# Patient Record
Sex: MALE | Race: WHITE | Employment: OTHER | URBAN - METROPOLITAN AREA
[De-identification: names, ages, dates, MRNs, and addresses within clinical notes are randomized per-mention and may not be internally consistent; named-entity substitution may affect disease eponyms.]

---

## 2017-03-10 ENCOUNTER — LAB ENCOUNTER (OUTPATIENT)
Dept: LAB | Age: 67
End: 2017-03-10
Attending: INTERNAL MEDICINE
Payer: MEDICARE

## 2017-03-10 DIAGNOSIS — I48.20 CHRONIC ATRIAL FIBRILLATION (HCC): Primary | ICD-10-CM

## 2017-03-10 LAB
INR BLD: 1.4 (ref 0.9–1.2)
PROTHROMBIN TIME: 17 SECONDS (ref 11.8–14.5)

## 2017-03-10 PROCEDURE — 36415 COLL VENOUS BLD VENIPUNCTURE: CPT

## 2017-03-10 PROCEDURE — 85610 PROTHROMBIN TIME: CPT

## 2017-03-15 ENCOUNTER — LAB ENCOUNTER (OUTPATIENT)
Dept: LAB | Age: 67
End: 2017-03-15
Attending: INTERNAL MEDICINE
Payer: MEDICARE

## 2017-03-15 DIAGNOSIS — I25.10 CORONARY ATHEROSCLEROSIS OF NATIVE CORONARY ARTERY: Primary | ICD-10-CM

## 2017-03-15 LAB
CHOLEST SERPL-MCNC: 216 MG/DL (ref 110–200)
HDLC SERPL-MCNC: 35 MG/DL
LDLC SERPL CALC-MCNC: 134 MG/DL (ref 0–99)
NONHDLC SERPL-MCNC: 181 MG/DL
TRIGL SERPL-MCNC: 233 MG/DL (ref 1–149)

## 2017-03-15 PROCEDURE — 36415 COLL VENOUS BLD VENIPUNCTURE: CPT

## 2017-03-15 PROCEDURE — 80061 LIPID PANEL: CPT

## 2017-04-04 ENCOUNTER — LAB ENCOUNTER (OUTPATIENT)
Dept: LAB | Age: 67
End: 2017-04-04
Attending: INTERNAL MEDICINE
Payer: MEDICARE

## 2017-04-04 DIAGNOSIS — I25.10 CORONARY ATHEROSCLEROSIS OF NATIVE CORONARY ARTERY: Primary | ICD-10-CM

## 2017-04-04 PROCEDURE — 85610 PROTHROMBIN TIME: CPT

## 2017-04-04 PROCEDURE — 36415 COLL VENOUS BLD VENIPUNCTURE: CPT

## 2017-05-11 ENCOUNTER — LAB ENCOUNTER (OUTPATIENT)
Dept: LAB | Age: 67
End: 2017-05-11
Attending: INTERNAL MEDICINE
Payer: MEDICARE

## 2017-05-11 DIAGNOSIS — I25.10 CORONARY ATHEROSCLEROSIS OF NATIVE CORONARY ARTERY: ICD-10-CM

## 2017-05-11 DIAGNOSIS — I48.20 CHRONIC ATRIAL FIBRILLATION (HCC): Primary | ICD-10-CM

## 2017-05-11 PROCEDURE — 36415 COLL VENOUS BLD VENIPUNCTURE: CPT

## 2017-05-11 PROCEDURE — 85610 PROTHROMBIN TIME: CPT

## 2017-05-11 PROCEDURE — 80061 LIPID PANEL: CPT

## 2017-06-12 ENCOUNTER — LAB ENCOUNTER (OUTPATIENT)
Dept: LAB | Age: 67
End: 2017-06-12
Attending: INTERNAL MEDICINE
Payer: MEDICARE

## 2017-06-12 DIAGNOSIS — I48.20 CHRONIC ATRIAL FIBRILLATION (HCC): ICD-10-CM

## 2017-06-12 DIAGNOSIS — I25.10 CORONARY ATHEROSCLEROSIS OF NATIVE CORONARY ARTERY: Primary | ICD-10-CM

## 2017-06-12 PROCEDURE — 36415 COLL VENOUS BLD VENIPUNCTURE: CPT

## 2017-06-12 PROCEDURE — 85610 PROTHROMBIN TIME: CPT

## 2017-07-26 ENCOUNTER — LAB ENCOUNTER (OUTPATIENT)
Dept: LAB | Age: 67
End: 2017-07-26
Attending: INTERNAL MEDICINE
Payer: MEDICARE

## 2017-07-26 DIAGNOSIS — I25.10 CORONARY ATHEROSCLEROSIS OF NATIVE CORONARY ARTERY: Primary | ICD-10-CM

## 2017-07-26 LAB
ANION GAP SERPL CALC-SCNC: 7 MMOL/L (ref 0–18)
BUN SERPL-MCNC: 45 MG/DL (ref 8–20)
BUN/CREAT SERPL: 16.5 (ref 10–20)
CALCIUM SERPL-MCNC: 9.1 MG/DL (ref 8.5–10.5)
CHLORIDE SERPL-SCNC: 105 MMOL/L (ref 95–110)
CHOLEST SERPL-MCNC: 165 MG/DL (ref 110–200)
CO2 SERPL-SCNC: 26 MMOL/L (ref 22–32)
CREAT SERPL-MCNC: 2.73 MG/DL (ref 0.5–1.5)
GLUCOSE SERPL-MCNC: 120 MG/DL (ref 70–99)
HDLC SERPL-MCNC: 40 MG/DL
LDLC SERPL CALC-MCNC: 97 MG/DL (ref 0–99)
NONHDLC SERPL-MCNC: 125 MG/DL
OSMOLALITY UR CALC.SUM OF ELEC: 299 MOSM/KG (ref 275–295)
POTASSIUM SERPL-SCNC: 5.8 MMOL/L (ref 3.3–5.1)
SODIUM SERPL-SCNC: 138 MMOL/L (ref 136–144)
TRIGL SERPL-MCNC: 138 MG/DL (ref 1–149)

## 2017-07-26 PROCEDURE — 80061 LIPID PANEL: CPT

## 2017-07-26 PROCEDURE — 36415 COLL VENOUS BLD VENIPUNCTURE: CPT

## 2017-07-26 PROCEDURE — 80048 BASIC METABOLIC PNL TOTAL CA: CPT

## 2017-09-13 ENCOUNTER — LAB ENCOUNTER (OUTPATIENT)
Dept: LAB | Age: 67
End: 2017-09-13
Attending: INTERNAL MEDICINE
Payer: MEDICARE

## 2017-09-13 DIAGNOSIS — I48.91 ATRIAL FIBRILLATION (HCC): Primary | ICD-10-CM

## 2017-09-13 LAB
ANION GAP SERPL CALC-SCNC: 8 MMOL/L (ref 0–18)
BUN SERPL-MCNC: 41 MG/DL (ref 8–20)
BUN/CREAT SERPL: 15.6 (ref 10–20)
CALCIUM SERPL-MCNC: 9.4 MG/DL (ref 8.5–10.5)
CHLORIDE SERPL-SCNC: 107 MMOL/L (ref 95–110)
CO2 SERPL-SCNC: 25 MMOL/L (ref 22–32)
CREAT SERPL-MCNC: 2.63 MG/DL (ref 0.5–1.5)
GLUCOSE SERPL-MCNC: 99 MG/DL (ref 70–99)
INR BLD: 1.6 (ref 0.9–1.2)
OSMOLALITY UR CALC.SUM OF ELEC: 300 MOSM/KG (ref 275–295)
POTASSIUM SERPL-SCNC: 5.1 MMOL/L (ref 3.3–5.1)
PROTHROMBIN TIME: 18.5 SECONDS (ref 11.8–14.5)
SODIUM SERPL-SCNC: 140 MMOL/L (ref 136–144)

## 2017-09-13 PROCEDURE — 80048 BASIC METABOLIC PNL TOTAL CA: CPT

## 2017-09-13 PROCEDURE — 36415 COLL VENOUS BLD VENIPUNCTURE: CPT

## 2017-09-13 PROCEDURE — 85610 PROTHROMBIN TIME: CPT

## 2017-10-23 ENCOUNTER — LAB ENCOUNTER (OUTPATIENT)
Dept: LAB | Age: 67
End: 2017-10-23
Attending: INTERNAL MEDICINE
Payer: MEDICARE

## 2017-10-23 DIAGNOSIS — I48.91 A-FIB (HCC): Primary | ICD-10-CM

## 2017-10-23 PROCEDURE — 80048 BASIC METABOLIC PNL TOTAL CA: CPT

## 2017-10-23 PROCEDURE — 85610 PROTHROMBIN TIME: CPT

## 2017-10-23 PROCEDURE — 36415 COLL VENOUS BLD VENIPUNCTURE: CPT

## 2017-12-15 ENCOUNTER — LAB ENCOUNTER (OUTPATIENT)
Dept: LAB | Age: 67
End: 2017-12-15
Attending: INTERNAL MEDICINE
Payer: MEDICARE

## 2017-12-15 DIAGNOSIS — I48.20 CHRONIC ATRIAL FIBRILLATION (HCC): Primary | ICD-10-CM

## 2017-12-15 PROCEDURE — 80048 BASIC METABOLIC PNL TOTAL CA: CPT

## 2017-12-15 PROCEDURE — 85610 PROTHROMBIN TIME: CPT

## 2017-12-15 PROCEDURE — 36415 COLL VENOUS BLD VENIPUNCTURE: CPT

## 2017-12-18 ENCOUNTER — LAB ENCOUNTER (OUTPATIENT)
Dept: LAB | Age: 67
End: 2017-12-18
Attending: INTERNAL MEDICINE
Payer: MEDICARE

## 2017-12-18 DIAGNOSIS — I48.20 CHRONIC ATRIAL FIBRILLATION (HCC): Primary | ICD-10-CM

## 2017-12-18 PROCEDURE — 80048 BASIC METABOLIC PNL TOTAL CA: CPT

## 2017-12-18 PROCEDURE — 36415 COLL VENOUS BLD VENIPUNCTURE: CPT

## 2017-12-18 PROCEDURE — 85610 PROTHROMBIN TIME: CPT

## 2017-12-28 ENCOUNTER — APPOINTMENT (OUTPATIENT)
Dept: LAB | Age: 67
End: 2017-12-28
Attending: INTERNAL MEDICINE
Payer: MEDICARE

## 2017-12-28 DIAGNOSIS — I48.20 CHRONIC ATRIAL FIBRILLATION (HCC): ICD-10-CM

## 2017-12-28 LAB
ANION GAP SERPL CALC-SCNC: 7 MMOL/L (ref 0–18)
BUN SERPL-MCNC: 40 MG/DL (ref 8–20)
BUN/CREAT SERPL: 17.5 (ref 10–20)
CALCIUM SERPL-MCNC: 9 MG/DL (ref 8.5–10.5)
CHLORIDE SERPL-SCNC: 107 MMOL/L (ref 95–110)
CO2 SERPL-SCNC: 25 MMOL/L (ref 22–32)
CREAT SERPL-MCNC: 2.28 MG/DL (ref 0.5–1.5)
GLUCOSE SERPL-MCNC: 130 MG/DL (ref 70–99)
INR BLD: 1.2 (ref 0.9–1.2)
OSMOLALITY UR CALC.SUM OF ELEC: 300 MOSM/KG (ref 275–295)
POTASSIUM SERPL-SCNC: 5 MMOL/L (ref 3.3–5.1)
PROTHROMBIN TIME: 14.4 SECONDS (ref 11.8–14.5)
SODIUM SERPL-SCNC: 139 MMOL/L (ref 136–144)

## 2017-12-28 PROCEDURE — 80048 BASIC METABOLIC PNL TOTAL CA: CPT

## 2017-12-28 PROCEDURE — 85610 PROTHROMBIN TIME: CPT

## 2017-12-28 PROCEDURE — 36415 COLL VENOUS BLD VENIPUNCTURE: CPT

## 2018-03-06 ENCOUNTER — APPOINTMENT (OUTPATIENT)
Dept: LAB | Age: 68
End: 2018-03-06
Attending: INTERNAL MEDICINE
Payer: MEDICARE

## 2018-03-06 DIAGNOSIS — I48.20 CHRONIC ATRIAL FIBRILLATION (HCC): ICD-10-CM

## 2018-03-06 LAB
ANION GAP SERPL CALC-SCNC: 7 MMOL/L (ref 0–18)
BUN SERPL-MCNC: 35 MG/DL (ref 8–20)
BUN/CREAT SERPL: 16.7 (ref 10–20)
CALCIUM SERPL-MCNC: 8.8 MG/DL (ref 8.5–10.5)
CHLORIDE SERPL-SCNC: 104 MMOL/L (ref 95–110)
CO2 SERPL-SCNC: 27 MMOL/L (ref 22–32)
CREAT SERPL-MCNC: 2.1 MG/DL (ref 0.5–1.5)
GLUCOSE SERPL-MCNC: 157 MG/DL (ref 70–99)
INR BLD: 3.6 (ref 0.9–1.2)
OSMOLALITY UR CALC.SUM OF ELEC: 297 MOSM/KG (ref 275–295)
POTASSIUM SERPL-SCNC: 4.3 MMOL/L (ref 3.3–5.1)
PROTHROMBIN TIME: 35.1 SECONDS (ref 11.8–14.5)
SODIUM SERPL-SCNC: 138 MMOL/L (ref 136–144)

## 2018-03-06 PROCEDURE — 36415 COLL VENOUS BLD VENIPUNCTURE: CPT

## 2018-03-06 PROCEDURE — 80048 BASIC METABOLIC PNL TOTAL CA: CPT

## 2018-03-06 PROCEDURE — 85610 PROTHROMBIN TIME: CPT

## 2018-04-10 ENCOUNTER — APPOINTMENT (OUTPATIENT)
Dept: LAB | Age: 68
End: 2018-04-10
Attending: INTERNAL MEDICINE
Payer: MEDICARE

## 2018-04-10 DIAGNOSIS — I48.20 CHRONIC ATRIAL FIBRILLATION (HCC): ICD-10-CM

## 2018-04-10 PROCEDURE — 36415 COLL VENOUS BLD VENIPUNCTURE: CPT

## 2018-04-10 PROCEDURE — 80048 BASIC METABOLIC PNL TOTAL CA: CPT

## 2018-04-10 PROCEDURE — 85610 PROTHROMBIN TIME: CPT

## 2018-05-07 ENCOUNTER — LAB ENCOUNTER (OUTPATIENT)
Dept: LAB | Age: 68
End: 2018-05-07
Attending: INTERNAL MEDICINE
Payer: MEDICARE

## 2018-05-07 DIAGNOSIS — I48.20 CHRONIC ATRIAL FIBRILLATION (HCC): ICD-10-CM

## 2018-05-07 DIAGNOSIS — E11.21 DIABETIC GLOMERULOPATHY (HCC): Primary | ICD-10-CM

## 2018-05-07 PROCEDURE — 80048 BASIC METABOLIC PNL TOTAL CA: CPT

## 2018-05-07 PROCEDURE — 36415 COLL VENOUS BLD VENIPUNCTURE: CPT

## 2018-05-07 PROCEDURE — 83036 HEMOGLOBIN GLYCOSYLATED A1C: CPT

## 2018-05-16 ENCOUNTER — APPOINTMENT (OUTPATIENT)
Dept: LAB | Age: 68
End: 2018-05-16
Attending: INTERNAL MEDICINE
Payer: MEDICARE

## 2018-05-16 DIAGNOSIS — I48.20 CHRONIC ATRIAL FIBRILLATION (HCC): ICD-10-CM

## 2018-05-16 PROCEDURE — 36415 COLL VENOUS BLD VENIPUNCTURE: CPT

## 2018-05-16 PROCEDURE — 85610 PROTHROMBIN TIME: CPT

## 2018-05-30 ENCOUNTER — LAB ENCOUNTER (OUTPATIENT)
Dept: LAB | Age: 68
End: 2018-05-30
Attending: INTERNAL MEDICINE
Payer: MEDICARE

## 2018-05-30 DIAGNOSIS — I48.91 A-FIB (HCC): Primary | ICD-10-CM

## 2018-05-30 PROCEDURE — 85610 PROTHROMBIN TIME: CPT

## 2018-05-30 PROCEDURE — 36415 COLL VENOUS BLD VENIPUNCTURE: CPT

## 2018-06-12 ENCOUNTER — APPOINTMENT (OUTPATIENT)
Dept: LAB | Age: 68
End: 2018-06-12
Attending: INTERNAL MEDICINE
Payer: MEDICARE

## 2018-06-12 PROCEDURE — 85610 PROTHROMBIN TIME: CPT

## 2018-06-12 PROCEDURE — 36415 COLL VENOUS BLD VENIPUNCTURE: CPT

## 2018-06-15 ENCOUNTER — LAB ENCOUNTER (OUTPATIENT)
Dept: LAB | Age: 68
End: 2018-06-15
Attending: INTERNAL MEDICINE
Payer: MEDICARE

## 2018-06-15 DIAGNOSIS — I25.10 CORONARY ATHEROSCLEROSIS OF NATIVE CORONARY ARTERY: Primary | ICD-10-CM

## 2018-06-15 DIAGNOSIS — I48.20 CHRONIC ATRIAL FIBRILLATION (HCC): ICD-10-CM

## 2018-06-15 PROCEDURE — 36415 COLL VENOUS BLD VENIPUNCTURE: CPT

## 2018-06-15 PROCEDURE — 85610 PROTHROMBIN TIME: CPT

## 2018-06-15 PROCEDURE — 80061 LIPID PANEL: CPT

## 2018-06-22 ENCOUNTER — APPOINTMENT (OUTPATIENT)
Dept: LAB | Age: 68
End: 2018-06-22
Attending: INTERNAL MEDICINE
Payer: MEDICARE

## 2018-06-22 DIAGNOSIS — I48.20 CHRONIC ATRIAL FIBRILLATION (HCC): ICD-10-CM

## 2018-06-22 PROCEDURE — 85610 PROTHROMBIN TIME: CPT

## 2018-06-22 PROCEDURE — 36415 COLL VENOUS BLD VENIPUNCTURE: CPT

## 2018-06-29 ENCOUNTER — APPOINTMENT (OUTPATIENT)
Dept: LAB | Age: 68
End: 2018-06-29
Attending: INTERNAL MEDICINE
Payer: MEDICARE

## 2018-06-29 DIAGNOSIS — I48.20 CHRONIC ATRIAL FIBRILLATION (HCC): ICD-10-CM

## 2018-06-29 LAB
ANION GAP SERPL CALC-SCNC: 8 MMOL/L (ref 0–18)
BUN SERPL-MCNC: 39 MG/DL (ref 8–20)
BUN/CREAT SERPL: 15.2 (ref 10–20)
CALCIUM SERPL-MCNC: 8.7 MG/DL (ref 8.5–10.5)
CHLORIDE SERPL-SCNC: 106 MMOL/L (ref 95–110)
CO2 SERPL-SCNC: 24 MMOL/L (ref 22–32)
CREAT SERPL-MCNC: 2.57 MG/DL (ref 0.5–1.5)
GLUCOSE SERPL-MCNC: 111 MG/DL (ref 70–99)
INR BLD: 3 (ref 0.9–1.2)
OSMOLALITY UR CALC.SUM OF ELEC: 296 MOSM/KG (ref 275–295)
POTASSIUM SERPL-SCNC: 4.2 MMOL/L (ref 3.3–5.1)
PROTHROMBIN TIME: 30 SECONDS (ref 11.8–14.5)
SODIUM SERPL-SCNC: 138 MMOL/L (ref 136–144)

## 2018-06-29 PROCEDURE — 80048 BASIC METABOLIC PNL TOTAL CA: CPT

## 2018-06-29 PROCEDURE — 85610 PROTHROMBIN TIME: CPT

## 2018-06-29 PROCEDURE — 36415 COLL VENOUS BLD VENIPUNCTURE: CPT

## 2018-07-09 ENCOUNTER — LAB ENCOUNTER (OUTPATIENT)
Dept: LAB | Age: 68
End: 2018-07-09
Attending: INTERNAL MEDICINE
Payer: MEDICARE

## 2018-07-09 DIAGNOSIS — I48.91 ATRIAL FIBRILLATION (HCC): Primary | ICD-10-CM

## 2018-07-09 LAB
ANION GAP SERPL CALC-SCNC: 7 MMOL/L (ref 0–18)
BUN SERPL-MCNC: 44 MG/DL (ref 8–20)
BUN/CREAT SERPL: 19.2 (ref 10–20)
CALCIUM SERPL-MCNC: 8.7 MG/DL (ref 8.5–10.5)
CHLORIDE SERPL-SCNC: 107 MMOL/L (ref 95–110)
CO2 SERPL-SCNC: 25 MMOL/L (ref 22–32)
CREAT SERPL-MCNC: 2.29 MG/DL (ref 0.5–1.5)
GLUCOSE SERPL-MCNC: 137 MG/DL (ref 70–99)
INR BLD: 2.3 (ref 0.9–1.2)
OSMOLALITY UR CALC.SUM OF ELEC: 301 MOSM/KG (ref 275–295)
POTASSIUM SERPL-SCNC: 4.4 MMOL/L (ref 3.3–5.1)
PROTHROMBIN TIME: 24.5 SECONDS (ref 11.8–14.5)
SODIUM SERPL-SCNC: 139 MMOL/L (ref 136–144)

## 2018-07-09 PROCEDURE — 36415 COLL VENOUS BLD VENIPUNCTURE: CPT

## 2018-07-09 PROCEDURE — 85610 PROTHROMBIN TIME: CPT

## 2018-07-09 PROCEDURE — 80048 BASIC METABOLIC PNL TOTAL CA: CPT

## 2018-08-08 ENCOUNTER — LAB ENCOUNTER (OUTPATIENT)
Dept: LAB | Age: 68
End: 2018-08-08
Attending: INTERNAL MEDICINE
Payer: MEDICARE

## 2018-08-08 DIAGNOSIS — I48.20 CHRONIC ATRIAL FIBRILLATION (HCC): ICD-10-CM

## 2018-08-08 DIAGNOSIS — I25.10 CORONARY ATHEROSCLEROSIS OF NATIVE CORONARY ARTERY: Primary | ICD-10-CM

## 2018-08-08 LAB
CHOLEST SERPL-MCNC: 149 MG/DL (ref 110–200)
HDLC SERPL-MCNC: 30 MG/DL
INR BLD: 2.1 (ref 0.9–1.2)
LDLC SERPL CALC-MCNC: 82 MG/DL (ref 0–99)
NONHDLC SERPL-MCNC: 119 MG/DL
PROTHROMBIN TIME: 23.2 SECONDS (ref 11.8–14.5)
TRIGL SERPL-MCNC: 187 MG/DL (ref 1–149)

## 2018-08-08 PROCEDURE — 80061 LIPID PANEL: CPT

## 2018-08-08 PROCEDURE — 36415 COLL VENOUS BLD VENIPUNCTURE: CPT

## 2018-08-08 PROCEDURE — 85610 PROTHROMBIN TIME: CPT

## 2018-09-06 ENCOUNTER — LAB ENCOUNTER (OUTPATIENT)
Dept: LAB | Age: 68
End: 2018-09-06
Attending: INTERNAL MEDICINE
Payer: MEDICARE

## 2018-09-06 DIAGNOSIS — I48.20 CHRONIC ATRIAL FIBRILLATION (HCC): ICD-10-CM

## 2018-09-06 DIAGNOSIS — I50.32 CHRONIC DIASTOLIC HEART FAILURE (HCC): Primary | ICD-10-CM

## 2018-09-06 LAB
ANION GAP SERPL CALC-SCNC: 11 MMOL/L (ref 0–18)
BUN SERPL-MCNC: 108 MG/DL (ref 8–20)
BUN/CREAT SERPL: 20.9 (ref 10–20)
CALCIUM SERPL-MCNC: 8.5 MG/DL (ref 8.5–10.5)
CHLORIDE SERPL-SCNC: 103 MMOL/L (ref 95–110)
CO2 SERPL-SCNC: 21 MMOL/L (ref 22–32)
CREAT SERPL-MCNC: 5.17 MG/DL (ref 0.5–1.5)
GLUCOSE SERPL-MCNC: 113 MG/DL (ref 70–99)
INR BLD: 3.5 (ref 0.9–1.2)
OSMOLALITY UR CALC.SUM OF ELEC: 315 MOSM/KG (ref 275–295)
POTASSIUM SERPL-SCNC: 4.6 MMOL/L (ref 3.3–5.1)
PROTHROMBIN TIME: 34.3 SECONDS (ref 11.8–14.5)
SODIUM SERPL-SCNC: 135 MMOL/L (ref 136–144)

## 2018-09-06 PROCEDURE — 36415 COLL VENOUS BLD VENIPUNCTURE: CPT

## 2018-09-06 PROCEDURE — 85610 PROTHROMBIN TIME: CPT

## 2018-09-06 PROCEDURE — 80048 BASIC METABOLIC PNL TOTAL CA: CPT

## 2018-10-04 ENCOUNTER — HOSPITAL ENCOUNTER (EMERGENCY)
Facility: HOSPITAL | Age: 68
Discharge: HOME | End: 2018-10-04
Attending: EMERGENCY MEDICINE
Payer: MEDICARE

## 2018-10-04 VITALS
HEART RATE: 64 BPM | WEIGHT: 190.38 LBS | DIASTOLIC BLOOD PRESSURE: 81 MMHG | TEMPERATURE: 98 F | SYSTOLIC BLOOD PRESSURE: 168 MMHG | BODY MASS INDEX: 30.59 KG/M2 | HEIGHT: 66 IN | RESPIRATION RATE: 20 BRPM | OXYGEN SATURATION: 96 %

## 2018-10-04 DIAGNOSIS — N28.9 RENAL INSUFFICIENCY: Primary | ICD-10-CM

## 2018-10-04 DIAGNOSIS — I15.9 SECONDARY HYPERTENSION: ICD-10-CM

## 2018-10-04 PROCEDURE — 85025 COMPLETE CBC W/AUTO DIFF WBC: CPT

## 2018-10-04 PROCEDURE — 80048 BASIC METABOLIC PNL TOTAL CA: CPT

## 2018-10-04 PROCEDURE — 85610 PROTHROMBIN TIME: CPT

## 2018-10-04 PROCEDURE — 81001 URINALYSIS AUTO W/SCOPE: CPT

## 2018-10-04 PROCEDURE — 80048 BASIC METABOLIC PNL TOTAL CA: CPT | Performed by: EMERGENCY MEDICINE

## 2018-10-04 PROCEDURE — 85610 PROTHROMBIN TIME: CPT | Performed by: EMERGENCY MEDICINE

## 2018-10-04 PROCEDURE — 36415 COLL VENOUS BLD VENIPUNCTURE: CPT

## 2018-10-04 PROCEDURE — 99284 EMERGENCY DEPT VISIT MOD MDM: CPT

## 2018-10-04 PROCEDURE — 81001 URINALYSIS AUTO W/SCOPE: CPT | Performed by: EMERGENCY MEDICINE

## 2018-10-04 PROCEDURE — 85025 COMPLETE CBC W/AUTO DIFF WBC: CPT | Performed by: EMERGENCY MEDICINE

## 2018-10-04 RX ORDER — CARVEDILOL 12.5 MG/1
12.5 TABLET ORAL 2 TIMES DAILY WITH MEALS
Qty: 60 TABLET | Refills: 0 | Status: SHIPPED | OUTPATIENT
Start: 2018-10-04 | End: 2018-11-03

## 2018-10-04 NOTE — ED INITIAL ASSESSMENT (HPI)
Patient sent from Dr. Magy Mendenhall for elevated INR and kidney function. Althought patient has not had blood drawn since mid September. Patient feels fine.

## 2018-10-04 NOTE — ED PROVIDER NOTES
Patient Seen in: [facility] AND [facility] Emergency Department    History   Patient presents with:  Abnormal Result (metabolic, cardiac)    Stated Complaint: sent from PCP for lab work (INR/PTT)    HPI    Patient complains of elevated blood pressure and elevate (36.8 °C)   Temp src Oral   SpO2 94 %   O2 Device None (Room air)       Current:BP (!) 168/81   Pulse 64   Temp 98.1 °F (36.7 °C) (Oral)   Resp 20   Ht 167.6 cm (5' 6\")   Wt 86.4 kg   SpO2 96%   BMI 30.73 kg/m²    PULSE OX Nl on room air    GENERAL: r fac -----------         ------                     CBC W/ DIFFERENTIAL[[id number]]          Abnormal            Final result                 Please view results for these tests on the individual orders.    [address]  [address] [id number] [phone]            Medications Prescribed:  Current Discharge Medication List    START taking these medications    carvedilol 12.5 MG Oral Tab  Take 1 tablet (12.5 mg total) by mouth 2 (two) times daily with

## 2018-10-04 NOTE — ED NOTES
Pt states he saw his cardiologist, who sent him here due to abnormal INR and Kidney function labs. States labs were drawn in September. Denies symptoms.

## 2018-11-14 ENCOUNTER — HOSPITAL ENCOUNTER (OUTPATIENT)
Dept: ULTRASOUND IMAGING | Facility: HOSPITAL | Age: 68
Discharge: HOME | End: 2018-11-14
Attending: INTERNAL MEDICINE
Payer: MEDICARE

## 2018-11-14 ENCOUNTER — LAB ENCOUNTER (OUTPATIENT)
Dept: LAB | Facility: HOSPITAL | Age: 68
End: 2018-11-14
Attending: INTERNAL MEDICINE
Payer: MEDICARE

## 2018-11-14 DIAGNOSIS — R60.9 EDEMA: ICD-10-CM

## 2018-11-14 DIAGNOSIS — D64.9 ANEMIA: ICD-10-CM

## 2018-11-14 DIAGNOSIS — I48.20 CHRONIC ATRIAL FIBRILLATION (HCC): ICD-10-CM

## 2018-11-14 DIAGNOSIS — N17.9 ACUTE KIDNEY FAILURE (HCC): ICD-10-CM

## 2018-11-14 DIAGNOSIS — I10 HTN (HYPERTENSION): ICD-10-CM

## 2018-11-14 DIAGNOSIS — N17.9 ACUTE KIDNEY FAILURE (HCC): Primary | ICD-10-CM

## 2018-11-14 PROCEDURE — 86255 FLUORESCENT ANTIBODY SCREEN: CPT

## 2018-11-14 PROCEDURE — 86803 HEPATITIS C AB TEST: CPT

## 2018-11-14 PROCEDURE — 86225 DNA ANTIBODY NATIVE: CPT

## 2018-11-14 PROCEDURE — 83970 ASSAY OF PARATHORMONE: CPT

## 2018-11-14 PROCEDURE — 85014 HEMATOCRIT: CPT

## 2018-11-14 PROCEDURE — 85610 PROTHROMBIN TIME: CPT

## 2018-11-14 PROCEDURE — 82570 ASSAY OF URINE CREATININE: CPT

## 2018-11-14 PROCEDURE — 83876 ASSAY MYELOPEROXIDASE: CPT

## 2018-11-14 PROCEDURE — 80069 RENAL FUNCTION PANEL: CPT

## 2018-11-14 PROCEDURE — 85018 HEMOGLOBIN: CPT

## 2018-11-14 PROCEDURE — 87340 HEPATITIS B SURFACE AG IA: CPT

## 2018-11-14 PROCEDURE — 86256 FLUORESCENT ANTIBODY TITER: CPT

## 2018-11-14 PROCEDURE — 86335 IMMUNFIX E-PHORSIS/URINE/CSF: CPT

## 2018-11-14 PROCEDURE — 86704 HEP B CORE ANTIBODY TOTAL: CPT

## 2018-11-14 PROCEDURE — 86038 ANTINUCLEAR ANTIBODIES: CPT

## 2018-11-14 PROCEDURE — 36415 COLL VENOUS BLD VENIPUNCTURE: CPT

## 2018-11-14 PROCEDURE — 76775 US EXAM ABDO BACK WALL LIM: CPT | Performed by: INTERNAL MEDICINE

## 2018-11-14 PROCEDURE — 82043 UR ALBUMIN QUANTITATIVE: CPT

## 2018-11-14 PROCEDURE — 81001 URINALYSIS AUTO W/SCOPE: CPT

## 2018-11-14 PROCEDURE — 83540 ASSAY OF IRON: CPT

## 2018-11-14 PROCEDURE — 83516 IMMUNOASSAY NONANTIBODY: CPT

## 2018-11-14 PROCEDURE — 86334 IMMUNOFIX E-PHORESIS SERUM: CPT

## 2018-11-14 PROCEDURE — 86039 ANTINUCLEAR ANTIBODIES (ANA): CPT

## 2018-11-14 PROCEDURE — 84166 PROTEIN E-PHORESIS/URINE/CSF: CPT

## 2018-11-14 PROCEDURE — 82728 ASSAY OF FERRITIN: CPT

## 2018-11-14 PROCEDURE — 84466 ASSAY OF TRANSFERRIN: CPT

## 2019-01-03 ENCOUNTER — LAB ENCOUNTER (OUTPATIENT)
Dept: LAB | Age: 69
End: 2019-01-03
Attending: INTERNAL MEDICINE
Payer: MEDICARE

## 2019-01-03 DIAGNOSIS — I48.91 A-FIB (HCC): Primary | ICD-10-CM

## 2019-01-03 LAB
INR BLD: 1.8 (ref 0.9–1.2)
PROTHROMBIN TIME: 20.3 SECONDS (ref 11.8–14.5)

## 2019-01-03 PROCEDURE — 36415 COLL VENOUS BLD VENIPUNCTURE: CPT

## 2019-01-03 PROCEDURE — 85610 PROTHROMBIN TIME: CPT

## 2019-02-06 ENCOUNTER — LAB ENCOUNTER (OUTPATIENT)
Dept: LAB | Facility: HOSPITAL | Age: 69
End: 2019-02-06
Attending: INTERNAL MEDICINE
Payer: MEDICARE

## 2019-02-06 DIAGNOSIS — D64.9 ANEMIA, UNSPECIFIED TYPE: ICD-10-CM

## 2019-02-06 DIAGNOSIS — I48.91 A-FIB (HCC): Primary | ICD-10-CM

## 2019-02-06 DIAGNOSIS — R60.9 EDEMA, UNSPECIFIED TYPE: ICD-10-CM

## 2019-02-06 DIAGNOSIS — I10 HYPERTENSION, UNSPECIFIED TYPE: ICD-10-CM

## 2019-02-06 DIAGNOSIS — N17.9 ACUTE RENAL FAILURE, UNSPECIFIED ACUTE RENAL FAILURE TYPE (HCC): ICD-10-CM

## 2019-02-06 LAB
ALBUMIN SERPL BCP-MCNC: 3.9 G/DL (ref 3.5–4.8)
ANION GAP SERPL CALC-SCNC: 11 MMOL/L (ref 0–18)
BUN SERPL-MCNC: 49 MG/DL (ref 8–20)
BUN/CREAT SERPL: 15.6 (ref 10–20)
CALCIUM SERPL-MCNC: 9.1 MG/DL (ref 8.5–10.5)
CHLORIDE SERPL-SCNC: 103 MMOL/L (ref 95–110)
CO2 SERPL-SCNC: 23 MMOL/L (ref 22–32)
CREAT SERPL-MCNC: 3.14 MG/DL (ref 0.5–1.5)
GLUCOSE SERPL-MCNC: 102 MG/DL (ref 70–99)
HCT VFR BLD AUTO: 28.1 % (ref 39–53)
HGB BLD-MCNC: 8.9 G/DL (ref 13–17.5)
INR BLD: 2.5 (ref 0.9–1.2)
OSMOLALITY UR CALC.SUM OF ELEC: 297 MOSM/KG (ref 275–295)
PHOSPHATE SERPL-MCNC: 3.5 MG/DL (ref 2.4–4.7)
POTASSIUM SERPL-SCNC: 4.6 MMOL/L (ref 3.3–5.1)
PROTHROMBIN TIME: 26.4 SECONDS (ref 11.8–14.5)
PTH-INTACT SERPL-MCNC: 181.6 PG/ML (ref 12–88)
SODIUM SERPL-SCNC: 137 MMOL/L (ref 136–144)

## 2019-02-06 PROCEDURE — 83970 ASSAY OF PARATHORMONE: CPT

## 2019-02-06 PROCEDURE — 85610 PROTHROMBIN TIME: CPT

## 2019-02-06 PROCEDURE — 36415 COLL VENOUS BLD VENIPUNCTURE: CPT

## 2019-02-06 PROCEDURE — 85018 HEMOGLOBIN: CPT

## 2019-02-06 PROCEDURE — 85014 HEMATOCRIT: CPT

## 2019-02-06 PROCEDURE — 80069 RENAL FUNCTION PANEL: CPT

## 2019-04-04 ENCOUNTER — APPOINTMENT (OUTPATIENT)
Dept: LAB | Facility: HOSPITAL | Age: 69
End: 2019-04-04
Attending: INTERNAL MEDICINE
Payer: MEDICARE

## 2019-04-04 DIAGNOSIS — I48.91 A-FIB (HCC): ICD-10-CM

## 2019-04-04 DIAGNOSIS — N18.4 CHRONIC KIDNEY DISEASE, STAGE IV (SEVERE) (HCC): ICD-10-CM

## 2019-04-04 PROCEDURE — 36415 COLL VENOUS BLD VENIPUNCTURE: CPT

## 2019-04-04 PROCEDURE — 80069 RENAL FUNCTION PANEL: CPT

## 2019-04-04 PROCEDURE — 85610 PROTHROMBIN TIME: CPT

## 2019-04-17 ENCOUNTER — APPOINTMENT (OUTPATIENT)
Dept: LAB | Facility: HOSPITAL | Age: 69
End: 2019-04-17
Attending: INTERNAL MEDICINE
Payer: MEDICARE

## 2019-04-17 DIAGNOSIS — I48.91 A-FIB (HCC): ICD-10-CM

## 2019-04-17 PROCEDURE — 85610 PROTHROMBIN TIME: CPT

## 2019-04-17 PROCEDURE — 36415 COLL VENOUS BLD VENIPUNCTURE: CPT

## 2019-04-19 ENCOUNTER — APPOINTMENT (OUTPATIENT)
Dept: LAB | Facility: HOSPITAL | Age: 69
End: 2019-04-19
Attending: INTERNAL MEDICINE
Payer: MEDICARE

## 2019-04-19 DIAGNOSIS — I48.91 A-FIB (HCC): ICD-10-CM

## 2019-04-19 PROCEDURE — 85610 PROTHROMBIN TIME: CPT

## 2019-04-19 PROCEDURE — 36415 COLL VENOUS BLD VENIPUNCTURE: CPT

## 2019-04-22 ENCOUNTER — APPOINTMENT (OUTPATIENT)
Dept: LAB | Facility: HOSPITAL | Age: 69
End: 2019-04-22
Attending: INTERNAL MEDICINE
Payer: MEDICARE

## 2019-04-22 DIAGNOSIS — I48.91 A-FIB (HCC): ICD-10-CM

## 2019-04-22 PROCEDURE — 85610 PROTHROMBIN TIME: CPT

## 2019-04-22 PROCEDURE — 36415 COLL VENOUS BLD VENIPUNCTURE: CPT

## 2019-05-07 ENCOUNTER — APPOINTMENT (OUTPATIENT)
Dept: LAB | Facility: HOSPITAL | Age: 69
End: 2019-05-07
Attending: INTERNAL MEDICINE
Payer: MEDICARE

## 2019-05-07 DIAGNOSIS — I48.91 A-FIB (HCC): ICD-10-CM

## 2019-05-07 PROCEDURE — 36415 COLL VENOUS BLD VENIPUNCTURE: CPT

## 2019-05-07 PROCEDURE — 85610 PROTHROMBIN TIME: CPT

## 2019-05-29 ENCOUNTER — LAB ENCOUNTER (OUTPATIENT)
Dept: LAB | Facility: HOSPITAL | Age: 69
End: 2019-05-29
Attending: INTERNAL MEDICINE
Payer: MEDICARE

## 2019-05-29 DIAGNOSIS — R03.0 ELEVATED BLOOD PRESSURE READING WITHOUT DIAGNOSIS OF HYPERTENSION: ICD-10-CM

## 2019-05-29 DIAGNOSIS — N18.4 CHRONIC KIDNEY DISEASE, STAGE IV (SEVERE) (HCC): ICD-10-CM

## 2019-05-29 DIAGNOSIS — I25.10 CORONARY ATHEROSCLEROSIS OF NATIVE CORONARY ARTERY: ICD-10-CM

## 2019-05-29 DIAGNOSIS — E78.5 HYPERLIPEMIA: ICD-10-CM

## 2019-05-29 DIAGNOSIS — I48.91 A-FIB (HCC): ICD-10-CM

## 2019-05-29 DIAGNOSIS — E11.9 DIABETES MELLITUS (HCC): Primary | ICD-10-CM

## 2019-05-29 PROCEDURE — 80061 LIPID PANEL: CPT

## 2019-05-29 PROCEDURE — 85014 HEMATOCRIT: CPT

## 2019-05-29 PROCEDURE — 84100 ASSAY OF PHOSPHORUS: CPT

## 2019-05-29 PROCEDURE — 80053 COMPREHEN METABOLIC PANEL: CPT

## 2019-05-29 PROCEDURE — 85025 COMPLETE CBC W/AUTO DIFF WBC: CPT

## 2019-05-29 PROCEDURE — 85610 PROTHROMBIN TIME: CPT

## 2019-05-29 PROCEDURE — 83036 HEMOGLOBIN GLYCOSYLATED A1C: CPT

## 2019-05-29 PROCEDURE — 85018 HEMOGLOBIN: CPT

## 2019-05-29 PROCEDURE — 36415 COLL VENOUS BLD VENIPUNCTURE: CPT

## 2019-06-22 ENCOUNTER — APPOINTMENT (OUTPATIENT)
Dept: LAB | Facility: HOSPITAL | Age: 69
End: 2019-06-22
Attending: INTERNAL MEDICINE
Payer: MEDICARE

## 2019-06-22 DIAGNOSIS — N18.4 CHRONIC KIDNEY DISEASE, STAGE IV (SEVERE) (HCC): ICD-10-CM

## 2019-06-22 DIAGNOSIS — I48.91 A-FIB (HCC): ICD-10-CM

## 2019-06-22 PROCEDURE — 83970 ASSAY OF PARATHORMONE: CPT

## 2019-06-22 PROCEDURE — 83540 ASSAY OF IRON: CPT

## 2019-06-22 PROCEDURE — 84466 ASSAY OF TRANSFERRIN: CPT

## 2019-06-22 PROCEDURE — 36415 COLL VENOUS BLD VENIPUNCTURE: CPT

## 2019-06-22 PROCEDURE — 87340 HEPATITIS B SURFACE AG IA: CPT

## 2019-06-22 PROCEDURE — 85018 HEMOGLOBIN: CPT

## 2019-06-22 PROCEDURE — 82728 ASSAY OF FERRITIN: CPT

## 2019-06-22 PROCEDURE — 85610 PROTHROMBIN TIME: CPT

## 2019-06-22 PROCEDURE — 80069 RENAL FUNCTION PANEL: CPT

## 2019-06-22 PROCEDURE — 86706 HEP B SURFACE ANTIBODY: CPT

## 2019-06-22 PROCEDURE — 86704 HEP B CORE ANTIBODY TOTAL: CPT

## 2019-06-22 PROCEDURE — 85014 HEMATOCRIT: CPT

## 2019-06-25 ENCOUNTER — APPOINTMENT (OUTPATIENT)
Dept: LAB | Facility: HOSPITAL | Age: 69
End: 2019-06-25
Attending: INTERNAL MEDICINE
Payer: MEDICARE

## 2019-06-25 DIAGNOSIS — N18.4 CHRONIC KIDNEY DISEASE, STAGE IV (SEVERE) (HCC): ICD-10-CM

## 2019-06-25 DIAGNOSIS — R60.9 EDEMA, UNSPECIFIED TYPE: ICD-10-CM

## 2019-06-25 DIAGNOSIS — I10 ESSENTIAL (PRIMARY) HYPERTENSION: ICD-10-CM

## 2019-06-25 DIAGNOSIS — D64.9 ANEMIA, UNSPECIFIED TYPE: ICD-10-CM

## 2019-06-25 PROCEDURE — 80069 RENAL FUNCTION PANEL: CPT

## 2019-06-25 PROCEDURE — 36415 COLL VENOUS BLD VENIPUNCTURE: CPT

## 2019-07-25 ENCOUNTER — APPOINTMENT (OUTPATIENT)
Dept: INTERVENTIONAL RADIOLOGY/VASCULAR | Facility: HOSPITAL | Age: 69
DRG: 264 | End: 2019-07-25
Attending: INTERNAL MEDICINE
Payer: MEDICARE

## 2019-07-25 ENCOUNTER — HOSPITAL ENCOUNTER (INPATIENT)
Facility: HOSPITAL | Age: 69
LOS: 6 days | Discharge: HOME | DRG: 264 | End: 2019-07-31
Attending: EMERGENCY MEDICINE | Admitting: INTERNAL MEDICINE
Payer: MEDICARE

## 2019-07-25 ENCOUNTER — APPOINTMENT (OUTPATIENT)
Dept: GENERAL RADIOLOGY | Facility: HOSPITAL | Age: 69
DRG: 264 | End: 2019-07-25
Attending: EMERGENCY MEDICINE
Payer: MEDICARE

## 2019-07-25 DIAGNOSIS — N18.9 ACUTE RENAL FAILURE SUPERIMPOSED ON CHRONIC KIDNEY DISEASE, UNSPECIFIED CKD STAGE, UNSPECIFIED ACUTE RENAL FAILURE TYPE (HCC): Primary | ICD-10-CM

## 2019-07-25 DIAGNOSIS — N17.9 ACUTE RENAL FAILURE SUPERIMPOSED ON CHRONIC KIDNEY DISEASE, UNSPECIFIED CKD STAGE, UNSPECIFIED ACUTE RENAL FAILURE TYPE (HCC): Primary | ICD-10-CM

## 2019-07-25 DIAGNOSIS — D64.9 ANEMIA, UNSPECIFIED TYPE: ICD-10-CM

## 2019-07-25 DIAGNOSIS — R00.1 BRADYCARDIA: ICD-10-CM

## 2019-07-25 PROBLEM — R79.89 AZOTEMIA: Status: ACTIVE | Noted: 2019-07-25

## 2019-07-25 PROBLEM — E87.2 METABOLIC ACIDOSIS: Status: ACTIVE | Noted: 2019-07-25

## 2019-07-25 PROBLEM — E87.5 HYPERKALEMIA: Status: ACTIVE | Noted: 2019-07-25

## 2019-07-25 LAB
ANION GAP SERPL CALC-SCNC: 12 MMOL/L (ref 0–18)
ANTIBODY SCREEN: NEGATIVE
APTT PPP: 40.2 SECONDS (ref 23.2–35.3)
BASOPHILS # BLD AUTO: 0.02 X10(3) UL (ref 0–0.2)
BASOPHILS NFR BLD AUTO: 0.3 %
BUN BLD-MCNC: 140 MG/DL (ref 7–18)
BUN/CREAT SERPL: 31.2 (ref 10–20)
CALCIUM BLD-MCNC: 8.8 MG/DL (ref 8.5–10.1)
CHLORIDE SERPL-SCNC: 110 MMOL/L (ref 98–112)
CO2 SERPL-SCNC: 16 MMOL/L (ref 21–32)
CREAT BLD-MCNC: 4.49 MG/DL (ref 0.7–1.3)
DEPRECATED RDW RBC AUTO: 54.4 FL (ref 35.1–46.3)
EOSINOPHIL # BLD AUTO: 0.02 X10(3) UL (ref 0–0.7)
EOSINOPHIL NFR BLD AUTO: 0.3 %
ERYTHROCYTE [DISTWIDTH] IN BLOOD BY AUTOMATED COUNT: 16.8 % (ref 11–15)
EST. AVERAGE GLUCOSE BLD GHB EST-MCNC: 137 MG/DL (ref 68–126)
GLUCOSE BLD-MCNC: 201 MG/DL (ref 70–99)
GLUCOSE BLDC GLUCOMTR-MCNC: 185 MG/DL (ref 70–99)
GLUCOSE BLDC GLUCOMTR-MCNC: 206 MG/DL (ref 70–99)
HBA1C MFR BLD HPLC: 6.4 % (ref ?–5.7)
HBV CORE AB SERPL QL IA: NONREACTIVE
HBV SURFACE AB SER QL: NONREACTIVE
HBV SURFACE AB SERPL IA-ACNC: <3.1 MIU/ML
HBV SURFACE AG SER-ACNC: <0.1 [IU]/L
HBV SURFACE AG SERPL QL IA: NONREACTIVE
HCT VFR BLD AUTO: 24.5 % (ref 39–53)
HGB BLD-MCNC: 7.6 G/DL (ref 13–17.5)
IMM GRANULOCYTES # BLD AUTO: 0.01 X10(3) UL (ref 0–1)
IMM GRANULOCYTES NFR BLD: 0.2 %
INR BLD: 2.75 (ref 0.9–1.2)
LYMPHOCYTES # BLD AUTO: 0.3 X10(3) UL (ref 1–4)
LYMPHOCYTES NFR BLD AUTO: 4.8 %
MCH RBC QN AUTO: 27.4 PG (ref 26–34)
MCHC RBC AUTO-ENTMCNC: 31 G/DL (ref 31–37)
MCV RBC AUTO: 88.4 FL (ref 80–100)
MONOCYTES # BLD AUTO: 0.35 X10(3) UL (ref 0.1–1)
MONOCYTES NFR BLD AUTO: 5.6 %
NEUTROPHILS # BLD AUTO: 5.59 X10 (3) UL (ref 1.5–7.7)
NEUTROPHILS # BLD AUTO: 5.59 X10(3) UL (ref 1.5–7.7)
NEUTROPHILS NFR BLD AUTO: 88.8 %
NT-PROBNP SERPL-MCNC: ABNORMAL PG/ML (ref ?–125)
OSMOLALITY SERPL CALC.SUM OF ELEC: 337 MOSM/KG (ref 275–295)
PLATELET # BLD AUTO: 182 10(3)UL (ref 150–450)
POTASSIUM SERPL-SCNC: 5.6 MMOL/L (ref 3.5–5.1)
PROTHROMBIN TIME: 29.6 SECONDS (ref 11.8–14.5)
RBC # BLD AUTO: 2.77 X10(6)UL (ref 3.8–5.8)
RH BLOOD TYPE: NEGATIVE
SODIUM SERPL-SCNC: 138 MMOL/L (ref 136–145)
TSI SER-ACNC: 1.53 MIU/ML (ref 0.36–3.74)
WBC # BLD AUTO: 6.3 X10(3) UL (ref 4–11)

## 2019-07-25 PROCEDURE — 84443 ASSAY THYROID STIM HORMONE: CPT | Performed by: INTERNAL MEDICINE

## 2019-07-25 PROCEDURE — 86900 BLOOD TYPING SEROLOGIC ABO: CPT | Performed by: EMERGENCY MEDICINE

## 2019-07-25 PROCEDURE — 93010 ELECTROCARDIOGRAM REPORT: CPT | Performed by: EMERGENCY MEDICINE

## 2019-07-25 PROCEDURE — 71045 X-RAY EXAM CHEST 1 VIEW: CPT | Performed by: EMERGENCY MEDICINE

## 2019-07-25 PROCEDURE — 90935 HEMODIALYSIS ONE EVALUATION: CPT

## 2019-07-25 PROCEDURE — 5A1D70Z PERFORMANCE OF URINARY FILTRATION, INTERMITTENT, LESS THAN 6 HOURS PER DAY: ICD-10-PCS | Performed by: INTERNAL MEDICINE

## 2019-07-25 PROCEDURE — B5181ZA FLUOROSCOPY OF SUPERIOR VENA CAVA USING LOW OSMOLAR CONTRAST, GUIDANCE: ICD-10-PCS | Performed by: RADIOLOGY

## 2019-07-25 PROCEDURE — 02HV33Z INSERTION OF INFUSION DEVICE INTO SUPERIOR VENA CAVA, PERCUTANEOUS APPROACH: ICD-10-PCS | Performed by: RADIOLOGY

## 2019-07-25 PROCEDURE — 86704 HEP B CORE ANTIBODY TOTAL: CPT | Performed by: INTERNAL MEDICINE

## 2019-07-25 PROCEDURE — 80500 HEPATITIS B PROFILE: CPT | Performed by: INTERNAL MEDICINE

## 2019-07-25 PROCEDURE — 99285 EMERGENCY DEPT VISIT HI MDM: CPT

## 2019-07-25 PROCEDURE — 93005 ELECTROCARDIOGRAM TRACING: CPT

## 2019-07-25 PROCEDURE — 86901 BLOOD TYPING SEROLOGIC RH(D): CPT | Performed by: EMERGENCY MEDICINE

## 2019-07-25 PROCEDURE — 86706 HEP B SURFACE ANTIBODY: CPT | Performed by: INTERNAL MEDICINE

## 2019-07-25 PROCEDURE — 85025 COMPLETE CBC W/AUTO DIFF WBC: CPT | Performed by: EMERGENCY MEDICINE

## 2019-07-25 PROCEDURE — 83880 ASSAY OF NATRIURETIC PEPTIDE: CPT | Performed by: EMERGENCY MEDICINE

## 2019-07-25 PROCEDURE — 36415 COLL VENOUS BLD VENIPUNCTURE: CPT

## 2019-07-25 PROCEDURE — 85730 THROMBOPLASTIN TIME PARTIAL: CPT | Performed by: EMERGENCY MEDICINE

## 2019-07-25 PROCEDURE — 30233N1 TRANSFUSION OF NONAUTOLOGOUS RED BLOOD CELLS INTO PERIPHERAL VEIN, PERCUTANEOUS APPROACH: ICD-10-PCS | Performed by: INTERNAL MEDICINE

## 2019-07-25 PROCEDURE — 80048 BASIC METABOLIC PNL TOTAL CA: CPT | Performed by: EMERGENCY MEDICINE

## 2019-07-25 PROCEDURE — 83036 HEMOGLOBIN GLYCOSYLATED A1C: CPT | Performed by: INTERNAL MEDICINE

## 2019-07-25 PROCEDURE — 36556 INSERT NON-TUNNEL CV CATH: CPT

## 2019-07-25 PROCEDURE — 87340 HEPATITIS B SURFACE AG IA: CPT | Performed by: INTERNAL MEDICINE

## 2019-07-25 PROCEDURE — 82962 GLUCOSE BLOOD TEST: CPT

## 2019-07-25 PROCEDURE — 77001 FLUOROGUIDE FOR VEIN DEVICE: CPT

## 2019-07-25 PROCEDURE — 86850 RBC ANTIBODY SCREEN: CPT | Performed by: EMERGENCY MEDICINE

## 2019-07-25 PROCEDURE — 85610 PROTHROMBIN TIME: CPT | Performed by: EMERGENCY MEDICINE

## 2019-07-25 RX ORDER — ATORVASTATIN CALCIUM 40 MG/1
80 TABLET, FILM COATED ORAL NIGHTLY
Status: DISCONTINUED | OUTPATIENT
Start: 2019-07-25 | End: 2019-07-31

## 2019-07-25 RX ORDER — FUROSEMIDE 40 MG/1
40 TABLET ORAL DAILY
Status: DISCONTINUED | OUTPATIENT
Start: 2019-07-25 | End: 2019-07-31

## 2019-07-25 RX ORDER — HEPARIN SODIUM 1000 [USP'U]/ML
INJECTION, SOLUTION INTRAVENOUS; SUBCUTANEOUS
Status: COMPLETED
Start: 2019-07-25 | End: 2019-07-25

## 2019-07-25 RX ORDER — DEXTROSE MONOHYDRATE 25 G/50ML
50 INJECTION, SOLUTION INTRAVENOUS AS NEEDED
Status: DISCONTINUED | OUTPATIENT
Start: 2019-07-25 | End: 2019-07-31

## 2019-07-25 RX ORDER — HYDRALAZINE HYDROCHLORIDE 100 MG/1
100 TABLET, FILM COATED ORAL 3 TIMES DAILY
Status: DISCONTINUED | OUTPATIENT
Start: 2019-07-25 | End: 2019-07-31

## 2019-07-25 RX ORDER — SPIRONOLACTONE 25 MG/1
25 TABLET ORAL EVERY EVENING
Status: DISCONTINUED | OUTPATIENT
Start: 2019-07-25 | End: 2019-07-25

## 2019-07-25 RX ORDER — LIDOCAINE HYDROCHLORIDE 20 MG/ML
INJECTION, SOLUTION EPIDURAL; INFILTRATION; INTRACAUDAL; PERINEURAL
Status: COMPLETED
Start: 2019-07-25 | End: 2019-07-25

## 2019-07-25 RX ORDER — CARVEDILOL 6.25 MG/1
6.25 TABLET ORAL 2 TIMES DAILY WITH MEALS
Status: DISCONTINUED | OUTPATIENT
Start: 2019-07-25 | End: 2019-07-25

## 2019-07-25 RX ORDER — AMIODARONE HYDROCHLORIDE 200 MG/1
200 TABLET ORAL NIGHTLY
Status: DISCONTINUED | OUTPATIENT
Start: 2019-07-25 | End: 2019-07-27

## 2019-07-25 RX ORDER — ISOSORBIDE MONONITRATE 60 MG/1
60 TABLET, EXTENDED RELEASE ORAL DAILY
Status: DISCONTINUED | OUTPATIENT
Start: 2019-07-26 | End: 2019-07-31

## 2019-07-25 NOTE — ED INITIAL ASSESSMENT (HPI)
Pt presents to ED with family stating he began feeling weak yesterday. Pt denies CP, SOB, vomiting or diarrhea. +nausea.      Pt to be admitted tomorrow for fistula placement to begin dialysis

## 2019-07-25 NOTE — PROGRESS NOTES
Celina Emery  [id number]  7/25/2019    Post procedure/ recovery hand-off report given to  RN. Patient's vital signs are stable. Procedural access site is dry and intact with  no signs and symptoms of bleeding/ hematoma. No sedation given to ptReid Caballero

## 2019-07-25 NOTE — CONSULTS
[facility] HOSP - [facility]    Report of Consultation    David Madrigal Patient Status:  Emergency    1950 MRN [id number]   Location [address] Attending Terrie Monreal MD   Hosp Day # 0 PCP Josy Castano MD, Eliza Lal MD systems was completed. Pertinent positives as above and all the rest were negative.      Physical Exam:   BP (!) 171/55   Pulse (!) 41   Temp 97.8 °F (36.6 °C) (Temporal)   Resp 16   Wt 182 lb 15.7 oz (83 kg)   SpO2 98%   BMI 29.53 kg/m²    No intake or ou Traci Del Angel, Dr Tawnya Adams and Dr Francesca Michael. Thank you for allowing me to participate in the care of your patient.     Moises Crowley  7/25/2019

## 2019-07-25 NOTE — H&P
[facility]    History and Physical    Jevon Rosanna Patient Status:  Inpatient    1950 MRN [id number]   Location [facility] 3W/SW Attending Bradford Martínez MD   Hosp Day # 0 PCP Andrew Flores MD, Radha Rios MD     Date:   Sodium 2 MG Oral Tab Take 2 mg by mouth daily. Last taken on Tuesday 7/23    spironolactone 25 MG Oral Tab Take 25 mg by mouth every evening. Review of Systems:     Constitutional: Positive for fatigue. HENT: Negative. Eyes: Negative.     Houston Serrano with mild CHF now noted. 3. Postop changes. Dictated by (CST): Danya Cleaning MD on 7/25/2019 at 14:14     Approved by (CST): Danya Cleaning MD on 7/25/2019 at 14:15          Ir Non-tunneled Catheter    Result Date: 7/25/2019  CONCLUSION:  1.  Ultr

## 2019-07-26 ENCOUNTER — ANESTHESIA EVENT (OUTPATIENT)
Dept: SURGERY | Facility: HOSPITAL | Age: 69
DRG: 264 | End: 2019-07-26
Payer: MEDICARE

## 2019-07-26 ENCOUNTER — ANESTHESIA (OUTPATIENT)
Dept: SURGERY | Facility: HOSPITAL | Age: 69
DRG: 264 | End: 2019-07-26
Payer: MEDICARE

## 2019-07-26 PROBLEM — I74.2 RADIAL ARTERY THROMBOSIS (HCC): Status: ACTIVE | Noted: 2019-07-26

## 2019-07-26 LAB
ALBUMIN SERPL-MCNC: 3.2 G/DL (ref 3.4–5)
ANION GAP SERPL CALC-SCNC: 9 MMOL/L (ref 0–18)
BASOPHILS # BLD AUTO: 0.02 X10(3) UL (ref 0–0.2)
BASOPHILS NFR BLD AUTO: 0.4 %
BUN BLD-MCNC: 64 MG/DL (ref 7–18)
BUN/CREAT SERPL: 24.1 (ref 10–20)
CALCIUM BLD-MCNC: 8.5 MG/DL (ref 8.5–10.1)
CHLORIDE SERPL-SCNC: 107 MMOL/L (ref 98–112)
CO2 SERPL-SCNC: 25 MMOL/L (ref 21–32)
CREAT BLD-MCNC: 2.66 MG/DL (ref 0.7–1.3)
DEPRECATED HBV CORE AB SER IA-ACNC: 96.8 NG/ML (ref 30–530)
DEPRECATED RDW RBC AUTO: 51.6 FL (ref 35.1–46.3)
EOSINOPHIL # BLD AUTO: 0.1 X10(3) UL (ref 0–0.7)
EOSINOPHIL NFR BLD AUTO: 1.9 %
ERYTHROCYTE [DISTWIDTH] IN BLOOD BY AUTOMATED COUNT: 16.6 % (ref 11–15)
GLUCOSE BLD-MCNC: 102 MG/DL (ref 70–99)
GLUCOSE BLDC GLUCOMTR-MCNC: 110 MG/DL (ref 70–99)
GLUCOSE BLDC GLUCOMTR-MCNC: 110 MG/DL (ref 70–99)
GLUCOSE BLDC GLUCOMTR-MCNC: 111 MG/DL (ref 70–99)
GLUCOSE BLDC GLUCOMTR-MCNC: 86 MG/DL (ref 70–99)
GLUCOSE BLDC GLUCOMTR-MCNC: 92 MG/DL (ref 70–99)
HCT VFR BLD AUTO: 22 % (ref 39–53)
HGB BLD-MCNC: 7.1 G/DL (ref 13–17.5)
IMM GRANULOCYTES # BLD AUTO: 0.01 X10(3) UL (ref 0–1)
IMM GRANULOCYTES NFR BLD: 0.2 %
INR BLD: 2.25 (ref 0.9–1.2)
IRON SATURATION: 11 % (ref 20–50)
IRON SERPL-MCNC: 32 UG/DL (ref 65–175)
LYMPHOCYTES # BLD AUTO: 0.31 X10(3) UL (ref 1–4)
LYMPHOCYTES NFR BLD AUTO: 5.9 %
MCH RBC QN AUTO: 27.7 PG (ref 26–34)
MCHC RBC AUTO-ENTMCNC: 32.3 G/DL (ref 31–37)
MCV RBC AUTO: 85.9 FL (ref 80–100)
MONOCYTES # BLD AUTO: 0.44 X10(3) UL (ref 0.1–1)
MONOCYTES NFR BLD AUTO: 8.3 %
NEUTROPHILS # BLD AUTO: 4.4 X10 (3) UL (ref 1.5–7.7)
NEUTROPHILS # BLD AUTO: 4.4 X10(3) UL (ref 1.5–7.7)
NEUTROPHILS NFR BLD AUTO: 83.3 %
OSMOLALITY SERPL CALC.SUM OF ELEC: 311 MOSM/KG (ref 275–295)
PHOSPHATE SERPL-MCNC: 3.4 MG/DL (ref 2.5–4.9)
PLATELET # BLD AUTO: 157 10(3)UL (ref 150–450)
POTASSIUM SERPL-SCNC: 4.2 MMOL/L (ref 3.5–5.1)
PROTHROMBIN TIME: 25.1 SECONDS (ref 11.8–14.5)
RBC # BLD AUTO: 2.56 X10(6)UL (ref 3.8–5.8)
SODIUM SERPL-SCNC: 141 MMOL/L (ref 136–145)
TOTAL IRON BINDING CAPACITY: 298 UG/DL (ref 240–450)
TRANSFERRIN SERPL-MCNC: 200 MG/DL (ref 200–360)
WBC # BLD AUTO: 5.3 X10(3) UL (ref 4–11)

## 2019-07-26 PROCEDURE — 82728 ASSAY OF FERRITIN: CPT | Performed by: INTERNAL MEDICINE

## 2019-07-26 PROCEDURE — 90935 HEMODIALYSIS ONE EVALUATION: CPT

## 2019-07-26 PROCEDURE — 031C09F BYPASS LEFT RADIAL ARTERY TO LOWER ARM VEIN WITH AUTOLOGOUS VENOUS TISSUE, OPEN APPROACH: ICD-10-PCS | Performed by: SURGERY

## 2019-07-26 PROCEDURE — 85610 PROTHROMBIN TIME: CPT | Performed by: INTERNAL MEDICINE

## 2019-07-26 PROCEDURE — 82962 GLUCOSE BLOOD TEST: CPT

## 2019-07-26 PROCEDURE — 85025 COMPLETE CBC W/AUTO DIFF WBC: CPT | Performed by: INTERNAL MEDICINE

## 2019-07-26 PROCEDURE — 84466 ASSAY OF TRANSFERRIN: CPT | Performed by: INTERNAL MEDICINE

## 2019-07-26 PROCEDURE — 80069 RENAL FUNCTION PANEL: CPT | Performed by: INTERNAL MEDICINE

## 2019-07-26 PROCEDURE — 83540 ASSAY OF IRON: CPT | Performed by: INTERNAL MEDICINE

## 2019-07-26 RX ORDER — PROCHLORPERAZINE EDISYLATE 5 MG/ML
5 INJECTION INTRAMUSCULAR; INTRAVENOUS ONCE AS NEEDED
Status: DISCONTINUED | OUTPATIENT
Start: 2019-07-26 | End: 2019-07-26 | Stop reason: HOSPADM

## 2019-07-26 RX ORDER — MORPHINE SULFATE 2 MG/ML
2 INJECTION, SOLUTION INTRAMUSCULAR; INTRAVENOUS EVERY 10 MIN PRN
Status: DISCONTINUED | OUTPATIENT
Start: 2019-07-26 | End: 2019-07-26 | Stop reason: HOSPADM

## 2019-07-26 RX ORDER — HYDROMORPHONE HYDROCHLORIDE 1 MG/ML
0.4 INJECTION, SOLUTION INTRAMUSCULAR; INTRAVENOUS; SUBCUTANEOUS EVERY 5 MIN PRN
Status: DISCONTINUED | OUTPATIENT
Start: 2019-07-26 | End: 2019-07-26 | Stop reason: HOSPADM

## 2019-07-26 RX ORDER — HYDROCODONE BITARTRATE AND ACETAMINOPHEN 5; 325 MG/1; MG/1
1 TABLET ORAL AS NEEDED
Status: DISCONTINUED | OUTPATIENT
Start: 2019-07-26 | End: 2019-07-26 | Stop reason: HOSPADM

## 2019-07-26 RX ORDER — ONDANSETRON 2 MG/ML
4 INJECTION INTRAMUSCULAR; INTRAVENOUS ONCE AS NEEDED
Status: COMPLETED | OUTPATIENT
Start: 2019-07-26 | End: 2019-07-26

## 2019-07-26 RX ORDER — SODIUM CHLORIDE 9 MG/ML
INJECTION, SOLUTION INTRAVENOUS CONTINUOUS PRN
Status: DISCONTINUED | OUTPATIENT
Start: 2019-07-26 | End: 2019-07-26 | Stop reason: SURG

## 2019-07-26 RX ORDER — HEPARIN SODIUM 1000 [USP'U]/ML
INJECTION, SOLUTION INTRAVENOUS; SUBCUTANEOUS AS NEEDED
Status: DISCONTINUED | OUTPATIENT
Start: 2019-07-26 | End: 2019-07-26 | Stop reason: SURG

## 2019-07-26 RX ORDER — HYDROMORPHONE HYDROCHLORIDE 1 MG/ML
0.6 INJECTION, SOLUTION INTRAMUSCULAR; INTRAVENOUS; SUBCUTANEOUS EVERY 5 MIN PRN
Status: DISCONTINUED | OUTPATIENT
Start: 2019-07-26 | End: 2019-07-26 | Stop reason: HOSPADM

## 2019-07-26 RX ORDER — SODIUM CHLORIDE, SODIUM LACTATE, POTASSIUM CHLORIDE, CALCIUM CHLORIDE 600; 310; 30; 20 MG/100ML; MG/100ML; MG/100ML; MG/100ML
INJECTION, SOLUTION INTRAVENOUS CONTINUOUS
Status: DISCONTINUED | OUTPATIENT
Start: 2019-07-26 | End: 2019-07-26 | Stop reason: HOSPADM

## 2019-07-26 RX ORDER — HEPARIN SODIUM 1000 [USP'U]/ML
INJECTION, SOLUTION INTRAVENOUS; SUBCUTANEOUS
Status: COMPLETED
Start: 2019-07-26 | End: 2019-07-26

## 2019-07-26 RX ORDER — NALOXONE HYDROCHLORIDE 0.4 MG/ML
80 INJECTION, SOLUTION INTRAMUSCULAR; INTRAVENOUS; SUBCUTANEOUS AS NEEDED
Status: DISCONTINUED | OUTPATIENT
Start: 2019-07-26 | End: 2019-07-26 | Stop reason: HOSPADM

## 2019-07-26 RX ORDER — MORPHINE SULFATE 10 MG/ML
6 INJECTION, SOLUTION INTRAMUSCULAR; INTRAVENOUS EVERY 10 MIN PRN
Status: DISCONTINUED | OUTPATIENT
Start: 2019-07-26 | End: 2019-07-26 | Stop reason: HOSPADM

## 2019-07-26 RX ORDER — CEFAZOLIN SODIUM/WATER 2 G/20 ML
2 SYRINGE (ML) INTRAVENOUS ONCE
Status: COMPLETED | OUTPATIENT
Start: 2019-07-26 | End: 2019-07-26

## 2019-07-26 RX ORDER — HYDROCODONE BITARTRATE AND ACETAMINOPHEN 5; 325 MG/1; MG/1
2 TABLET ORAL AS NEEDED
Status: DISCONTINUED | OUTPATIENT
Start: 2019-07-26 | End: 2019-07-26 | Stop reason: HOSPADM

## 2019-07-26 RX ORDER — BUPIVACAINE HYDROCHLORIDE 2.5 MG/ML
INJECTION, SOLUTION EPIDURAL; INFILTRATION; INTRACAUDAL AS NEEDED
Status: DISCONTINUED | OUTPATIENT
Start: 2019-07-26 | End: 2019-07-26 | Stop reason: HOSPADM

## 2019-07-26 RX ORDER — HYDROMORPHONE HYDROCHLORIDE 1 MG/ML
0.2 INJECTION, SOLUTION INTRAMUSCULAR; INTRAVENOUS; SUBCUTANEOUS EVERY 5 MIN PRN
Status: DISCONTINUED | OUTPATIENT
Start: 2019-07-26 | End: 2019-07-26 | Stop reason: HOSPADM

## 2019-07-26 RX ORDER — LIDOCAINE HYDROCHLORIDE 10 MG/ML
INJECTION, SOLUTION EPIDURAL; INFILTRATION; INTRACAUDAL; PERINEURAL AS NEEDED
Status: DISCONTINUED | OUTPATIENT
Start: 2019-07-26 | End: 2019-07-26 | Stop reason: SURG

## 2019-07-26 RX ORDER — GLYCOPYRROLATE 0.2 MG/ML
INJECTION INTRAMUSCULAR; INTRAVENOUS AS NEEDED
Status: DISCONTINUED | OUTPATIENT
Start: 2019-07-26 | End: 2019-07-26 | Stop reason: SURG

## 2019-07-26 RX ORDER — MORPHINE SULFATE 4 MG/ML
4 INJECTION, SOLUTION INTRAMUSCULAR; INTRAVENOUS EVERY 10 MIN PRN
Status: DISCONTINUED | OUTPATIENT
Start: 2019-07-26 | End: 2019-07-26 | Stop reason: HOSPADM

## 2019-07-26 RX ORDER — DEXTROSE MONOHYDRATE 25 G/50ML
50 INJECTION, SOLUTION INTRAVENOUS
Status: DISCONTINUED | OUTPATIENT
Start: 2019-07-26 | End: 2019-07-26 | Stop reason: HOSPADM

## 2019-07-26 RX ORDER — HYDROCODONE BITARTRATE AND ACETAMINOPHEN 10; 325 MG/1; MG/1
1 TABLET ORAL EVERY 4 HOURS PRN
Status: DISCONTINUED | OUTPATIENT
Start: 2019-07-26 | End: 2019-07-31

## 2019-07-26 RX ADMIN — CEFAZOLIN SODIUM/WATER 2 G: 2 G/20 ML SYRINGE (ML) INTRAVENOUS at 08:46:00

## 2019-07-26 RX ADMIN — SODIUM CHLORIDE: 9 INJECTION, SOLUTION INTRAVENOUS at 08:31:00

## 2019-07-26 RX ADMIN — LIDOCAINE HYDROCHLORIDE 25 MG: 10 INJECTION, SOLUTION EPIDURAL; INFILTRATION; INTRACAUDAL; PERINEURAL at 11:42:00

## 2019-07-26 RX ADMIN — SODIUM CHLORIDE: 9 INJECTION, SOLUTION INTRAVENOUS at 12:42:00

## 2019-07-26 RX ADMIN — HEPARIN SODIUM 1000 UNITS: 1000 INJECTION, SOLUTION INTRAVENOUS; SUBCUTANEOUS at 11:00:00

## 2019-07-26 RX ADMIN — HEPARIN SODIUM 2000 UNITS: 1000 INJECTION, SOLUTION INTRAVENOUS; SUBCUTANEOUS at 11:54:00

## 2019-07-26 RX ADMIN — LIDOCAINE HYDROCHLORIDE 20 MG: 10 INJECTION, SOLUTION EPIDURAL; INFILTRATION; INTRACAUDAL; PERINEURAL at 08:35:00

## 2019-07-26 RX ADMIN — GLYCOPYRROLATE 0.1 MG: 0.2 INJECTION INTRAMUSCULAR; INTRAVENOUS at 11:22:00

## 2019-07-26 NOTE — ANESTHESIA PROCEDURE NOTES
Airway  Date/Time: 7/26/2019 8:37 AM  Urgency: elective    Airway not difficult    General Information and Staff    Patient location during procedure: OR  Anesthesiologist: Bren Blackmon MD  Resident/CRNA: Mayela Rosario CRNA  Performed: CRNA

## 2019-07-26 NOTE — BRIEF OP NOTE
[facility] POST ANESTHESIA CARE UNIT  Brief Op Note       Patients Name: Radha Salinas  Attending Physician: Ras Fernandez MD  Operating Physician: Smooth Hernandez MD  CSN: [id number]     Location:  OR  MRN: [id number]    Date of Birth: [birth year]

## 2019-07-26 NOTE — CONSULTS
[facility]    Report of Consultation    David Madrigal Patient Status:  Inpatient    1950 MRN [id number]   Location [address] [facility] Attending Lc Baxter MD   Hosp Day # 1 PCP Josy Castano MD, Eliza Lal MD injection 50 mL, 50 mL, Intravenous, PRN  •  [MAR Hold] Glucose-Vitamin C (DEX-4) 4-6 GM-MG chewable tab 4 tablet, 4 tablet, Oral, Q15 Min PRN  •  [MAR Hold] glucose (DEX4) oral liquid 15 g, 15 g, Oral, Q15 Min PRN  •  [MAR Hold] hydrALAZINE HCl (APRESOLIN 07/25/2019 40.2 (H) 23.2 - 35.3 seconds Final     Comment:       Elevations of the aPTT in patients not receiving  anticoagulant therapy (Heparin, etc.), may be  seen in Factor deficiency, vitamin K deficiency,  factor inhibitors, liver disease, etc.  Cl

## 2019-07-26 NOTE — ANESTHESIA PREPROCEDURE EVALUATION
Anesthesia PreOp Note    HPI:     Oscar Nguyen is a 71year old male who presents for preoperative consultation requested by: Sandee Barnett MD    Date of Surgery: 7/25/2019 - 7/26/2019    Procedure(s):  A.V. FISTULA  Indication: end stage renal disea Jaylin Zambrano MD    [facility] Hold] Glucose-Vitamin C (DEX-4) 4-6 GM-MG chewable tab 4 tablet 4 tablet Oral Q15 Min PRN Olga Keyes MD    [facility] Hold] glucose (DEX4) oral liquid 15 g 15 g Oral Q15 Min PRN Olga Keyes MD    [facility] Hold] hydrALAZINE HCl (A Forced sexual activity: Not on file    Other Topics      Concerns:        Not on file    Social History Narrative      Not on file      Available pre-op labs reviewed.   Lab Results   Component Value Date    WBC 5.3 07/26/2019    RBC 2.56 (L) 07/26/2019 IV analgesics  Informed Consent Plan and Risks Discussed With:  Patient  Discussed plan with:  Surgeon      I have informed Dania Gersonclemente and/or legal guardian or family member of the nature of the anesthetic plan, benefits, risks including possible de

## 2019-07-26 NOTE — PROGRESS NOTES
[facility] HOSP - [facility]    Progress Note    Go Jacobsen Patient Status:  Inpatient    1950 MRN [id number]   Location [address] UNIT Attending Romina Lucio MD   Hosp Day # 1 PCP Brian Taylor MD, Dmitriy Patino MD 05/29/2019    ALT 24 05/29/2019    PTT 40.2 (H) 07/25/2019    INR 2.25 (H) 07/26/2019    TSH 1.530 07/25/2019    PHOS 3.4 07/26/2019       Xr Chest Ap Portable  (cpt=71045)    Result Date: 7/25/2019  CONCLUSION:  1. Unfavorable change from October 2, 2015.

## 2019-07-26 NOTE — PROGRESS NOTES
Spoke with April, RN in cath lab, stated that dialysis needs note put in stating okay to use tunneled catheter for dialysis this evening. Updated dialysis RN.

## 2019-07-26 NOTE — PROGRESS NOTES
cardiology progress note      VS S/P permacath placement      Current Facility-Administered Medications:  HYDROcodone-acetaminophen (NORCO)  MG per tab 1 tablet 1 tablet Oral Q4H PRN Sandee Barnett MD    amiodarone HCl (PACERONE) tab 200 mg 200 mg Or 25.0     EKG demonstrating sinus bradycardia, left axis. Chest x-ray demonstrating CHF. IMPRESSION:    1. End-stage renal disease with uremic symptoms. 2.   Sinus bradycardia related to beta blocker use. Amiodarone on board.   3.   History o

## 2019-07-26 NOTE — OR PREOP
Elin Perkins RN notified Dr. Abdulkadir Johnson of INR 2.25 this AM .  Dr. Ene Coles aware of INR as well.

## 2019-07-26 NOTE — ANESTHESIA POSTPROCEDURE EVALUATION
Patient: Celina Emery    Procedure Summary     Date:  07/26/19 Room / Location:  [address] [place] [address] MAIN OR 02 / [address] [facility] MAIN OR    Anesthesia Start:  0831 Anesthesia Stop:  9864    Procedure:   A.V. FISTULA (Left ) Diagnosis:  (end stage renal disease)    Surgeon:  Francesca Michael

## 2019-07-26 NOTE — PROGRESS NOTES
[facility] HOSP - [facility]    Progress Note    Nohemi Drake Patient Status:  Inpatient    1950 MRN [id number]   Location [place] [address] Attending Kunal Tejada MD   Hosp Day # 1 PCP Kassi Maza MD, MD Dhiraj Llanes 7.1 05/29/2019    AST 19 05/29/2019    ALT 24 05/29/2019    PTT 40.2 (H) 07/25/2019    INR 2.25 (H) 07/26/2019    TSH 1.530 07/25/2019    PHOS 3.4 07/26/2019       Xr Chest Ap Portable  (cpt=71045)    Result Date: 7/25/2019  CONCLUSION:  1.  Unfavorable jac

## 2019-07-26 NOTE — CM/SW NOTE
SW received MDO for outpatient HD; pt requesting [facility]. Referrals sent to [facility] & [facility] for review.      [facility]  P: [phone], F: [phone]    [facility]  P: [phone], F: [phone] [facility]

## 2019-07-26 NOTE — CONSULTS
[facility]    PATIENT'S NAME: Martha Phillips SRIDHAR   ATTENDING PHYSICIAN: Onur Cleaning MD   CONSULTING PHYSICIAN: Rusty Turner DO   PATIENT ACCOUNT#:   [de-identified]    LOCATION:  [address] RECORD #:   [id number]       DATE OF BIRTH: [birth year] , potassium 5.6. Hemoglobin is 7.6. INR is 2.75. EKG demonstrating sinus bradycardia, left axis. Chest x-ray demonstrating CHF. IMPRESSION:    1. End-stage renal disease with uremic symptoms.     2.   Sinus bradycardia related

## 2019-07-26 NOTE — ED PROVIDER NOTES
Patient Seen in: [facility] 3w/sw    History   Patient presents with:  Fatigue (constitutional, neurologic)    Stated Complaint: weakness    HPI    Patient complains of gen weakness with increased swelling and dyspnea on exertion.   Hx chf and siddharth above.    PSFH elements reviewed from today and agreed except as otherwise stated in HPI.     Physical Exam     ED Triage Vitals [07/25/19 1305]   /40   Pulse (!) 45   Resp 18   Temp 97.8 °F (36.6 °C)   Temp src Temporal   SpO2 98 %   O2 Device None ( components:    HgbA1C 6.4 (*)     Estimated Average Glucose 137 (*)     All other components within normal limits   POCT GLUCOSE - Abnormal; Notable for the following components:    POC Glucose  206 (*)     All other components within normal limits   POCT on EKG Report.   Rate: 40  Rhythm: Sinus Rhythm  Reading: sinus yaa           MDM       Cardiac Monitor: Pulse Readings from Last 1 Encounters:  07/25/19 : (!) 49  , sinus,      Radiology findings: Xr Chest Ap Portable  (cpt=71045)    Result Date: 7/25/20 specified.     Medications Prescribed:  Current Discharge Medication List        Present on Admission           ICD-10-CM Noted POA    * (Principal) Acute renal failure superimposed on chronic kidney disease, unspecified CKD stage, unspecified acute renal f

## 2019-07-26 NOTE — PLAN OF CARE
Problem: Patient Centered Care  Goal: Patient preferences are identified and integrated in the patient's plan of care  Description  Interventions:  - What would you like us to know as we care for you? From home with family.  Update patient on care plan pt.

## 2019-07-27 LAB
ALBUMIN SERPL-MCNC: 3 G/DL (ref 3.4–5)
ANION GAP SERPL CALC-SCNC: 7 MMOL/L (ref 0–18)
BUN BLD-MCNC: 39 MG/DL (ref 7–18)
BUN/CREAT SERPL: 16.3 (ref 10–20)
CALCIUM BLD-MCNC: 8.4 MG/DL (ref 8.5–10.1)
CHLORIDE SERPL-SCNC: 104 MMOL/L (ref 98–112)
CO2 SERPL-SCNC: 28 MMOL/L (ref 21–32)
CREAT BLD-MCNC: 2.39 MG/DL (ref 0.7–1.3)
DEPRECATED RDW RBC AUTO: 53.7 FL (ref 35.1–46.3)
ERYTHROCYTE [DISTWIDTH] IN BLOOD BY AUTOMATED COUNT: 17.1 % (ref 11–15)
GLUCOSE BLD-MCNC: 109 MG/DL (ref 70–99)
GLUCOSE BLDC GLUCOMTR-MCNC: 126 MG/DL (ref 70–99)
GLUCOSE BLDC GLUCOMTR-MCNC: 198 MG/DL (ref 70–99)
GLUCOSE BLDC GLUCOMTR-MCNC: 227 MG/DL (ref 70–99)
HCT VFR BLD AUTO: 23.1 % (ref 39–53)
HGB BLD-MCNC: 7.3 G/DL (ref 13–17.5)
MCH RBC QN AUTO: 27.5 PG (ref 26–34)
MCHC RBC AUTO-ENTMCNC: 31.6 G/DL (ref 31–37)
MCV RBC AUTO: 87.2 FL (ref 80–100)
OSMOLALITY SERPL CALC.SUM OF ELEC: 298 MOSM/KG (ref 275–295)
PHOSPHATE SERPL-MCNC: 3.3 MG/DL (ref 2.5–4.9)
PLATELET # BLD AUTO: 144 10(3)UL (ref 150–450)
POTASSIUM SERPL-SCNC: 4.3 MMOL/L (ref 3.5–5.1)
RBC # BLD AUTO: 2.65 X10(6)UL (ref 3.8–5.8)
SODIUM SERPL-SCNC: 139 MMOL/L (ref 136–145)
WBC # BLD AUTO: 6.6 X10(3) UL (ref 4–11)

## 2019-07-27 PROCEDURE — 90935 HEMODIALYSIS ONE EVALUATION: CPT

## 2019-07-27 PROCEDURE — 80069 RENAL FUNCTION PANEL: CPT | Performed by: SURGERY

## 2019-07-27 PROCEDURE — 82962 GLUCOSE BLOOD TEST: CPT

## 2019-07-27 PROCEDURE — 85027 COMPLETE CBC AUTOMATED: CPT | Performed by: SURGERY

## 2019-07-27 RX ORDER — ALBUMIN (HUMAN) 12.5 G/50ML
100 SOLUTION INTRAVENOUS AS NEEDED
Status: DISCONTINUED | OUTPATIENT
Start: 2019-07-27 | End: 2019-07-31

## 2019-07-27 RX ORDER — ONDANSETRON 2 MG/ML
INJECTION INTRAMUSCULAR; INTRAVENOUS
Status: DISPENSED
Start: 2019-07-27 | End: 2019-07-27

## 2019-07-27 RX ORDER — ONDANSETRON 2 MG/ML
4 INJECTION INTRAMUSCULAR; INTRAVENOUS EVERY 6 HOURS PRN
Status: DISCONTINUED | OUTPATIENT
Start: 2019-07-27 | End: 2019-07-31

## 2019-07-27 RX ORDER — AMIODARONE HYDROCHLORIDE 200 MG/1
100 TABLET ORAL NIGHTLY
Status: DISCONTINUED | OUTPATIENT
Start: 2019-07-27 | End: 2019-07-31

## 2019-07-27 RX ORDER — HEPARIN SODIUM 1000 [USP'U]/ML
1.5 INJECTION, SOLUTION INTRAVENOUS; SUBCUTANEOUS ONCE
Status: DISCONTINUED | OUTPATIENT
Start: 2019-07-27 | End: 2019-07-31

## 2019-07-27 NOTE — PROGRESS NOTES
[facility]    Progress Note    Jing Stewart Patient Status:  Inpatient    1950 MRN [id number]   Location [facility] 3W/SW Attending Myla Mcgee MD   Hosp Day # 2 PCP Jordy Reyes MD, Eugene Kent MD       Subjective:    Wi 05/29/2019    ALT 24 05/29/2019    PTT 40.2 (H) 07/25/2019    INR 2.25 (H) 07/26/2019    TSH 1.530 07/25/2019    PHOS 3.3 07/27/2019       Xr Chest Ap Portable  (cpt=71045)    Result Date: 7/25/2019  CONCLUSION:  1. Unfavorable change from October 2, 2015.

## 2019-07-27 NOTE — PLAN OF CARE
Problem: Patient/Family Goals  Goal: Patient/Family Short Term Goal  Description  Patient's Short Term Goal: Dialysis and get stronger    Interventions:   - AV fistula placement  - IJ tunneled catheter placement  - Monitor labs and VS  - See additional C

## 2019-07-27 NOTE — PROGRESS NOTES
[facility]    Progress Note    Cook Corbin Patient Status:  Inpatient    1950 MRN [id number]   Location [facility] 3W/SW Attending Manuel Tilley MD   Hosp Day # 2 PCP Tsering Joshua MD, Iza Alvarado MD     Subjective: POD

## 2019-07-27 NOTE — CONSULTS
[facility]    Report of Consultation    Margarita Pino Patient Status:  Inpatient    1950 MRN [id number]   Location [facility] 3W/SW Attending Mandy Arora MD   Hosp Day # 2 PCP Mohini Whittington MD, Minh Steven MD     Date of A unknown: Yes       Social History  Patient Guardian Status:  Not on file    Other Topics            Concern    None on file    Social History Narrative    None on file            Current Medications:    Current Facility-Administered Medications:  ondansetr temperature 98.2 °F (36.8 °C), temperature source Oral, resp. rate 18, height 5' 6\" (1.676 m), weight 171 lb 15.3 oz (78 kg), SpO2 97 %.   GENERAL: well developed, in no apparent distress  SKIN: no rashes,no suspicious lesions  HEENT: atraumatic, normoceph (CST): Emely Goodrich MD on 7/25/2019 at 14:14     Approved by (CST):  Emely Goodrich MD on 7/25/2019 at 14:15          Ir Non-tunneled Catheter    Result Date: 7/25/2019  PROCEDURE: IR NON-TUNNELLED CATHETER  INDICATIONS: renal failure requiring cath patient contact and decision making as well as counseling/coordination of care:  70 minutes  Thank you for allowing me to participate in the care of your patient.     [facility]), DO      7/27/2019

## 2019-07-27 NOTE — PROGRESS NOTES
Patient seen in follow up. Patient denies any chest pain or sob. Overall feels better. Edema is better.     07/27/19  0936   BP: 149/75   Pulse: (!) 49   Resp:    Temp: 98.2 °F (36.8 °C)       Intake/Output Summary (Last 24 hours) at 7/27/2019 1317  L CONCLUSION:  1. Unfavorable change from October 2, 2015. 2. Enlarging heart with mild CHF now noted. 3. Postop changes. Dictated by (CST): Jason Corbett MD on 7/25/2019 at 14:14     Approved by (CST):  Jason Corbett MD on 7/25/2019 at 14:15 [facility]  Phone: [phone]  [email or web address]

## 2019-07-27 NOTE — PLAN OF CARE
Problem: Patient Centered Care  Goal: Patient preferences are identified and integrated in the patient's plan of care  Description  Interventions:  - What would you like us to know as we care for you? From home with family.  Update patient on care plan

## 2019-07-27 NOTE — DIETARY NOTE
ADULT NUTRITION INITIAL ASSESSMENT    Pt is at moderate nutrition risk. Pt does not meet malnutrition criteria.       RECOMMENDATIONS TO MD:  See Nutrition Intervention     NUTRITION DIAGNOSIS/PROBLEM:  Unintentional weight loss related to decreased abilit from Last 10 Encounters:  07/27/19 : 78 kg (171 lb 15.3 oz)  10/04/18 : 86.4 kg (190 lb 6.4 oz)    GASTROINTESTINAL: nausea    FOOD/NUTRITION RELATED HISTORY:  Appetite: Fair to good  Intake: 100% on reported meal   Intake Meeting Needs: Marginal, added burk

## 2019-07-28 LAB
GLUCOSE BLDC GLUCOMTR-MCNC: 143 MG/DL (ref 70–99)
GLUCOSE BLDC GLUCOMTR-MCNC: 148 MG/DL (ref 70–99)
INR BLD: 1.37 (ref 0.9–1.2)
PROTHROMBIN TIME: 16.8 SECONDS (ref 11.8–14.5)

## 2019-07-28 PROCEDURE — 85610 PROTHROMBIN TIME: CPT | Performed by: INTERNAL MEDICINE

## 2019-07-28 PROCEDURE — 82962 GLUCOSE BLOOD TEST: CPT

## 2019-07-28 RX ORDER — HEPARIN SODIUM 1000 [USP'U]/ML
1.5 INJECTION, SOLUTION INTRAVENOUS; SUBCUTANEOUS ONCE
Status: DISCONTINUED | OUTPATIENT
Start: 2019-07-29 | End: 2019-07-31

## 2019-07-28 RX ORDER — ALBUMIN (HUMAN) 12.5 G/50ML
100 SOLUTION INTRAVENOUS AS NEEDED
Status: DISCONTINUED | OUTPATIENT
Start: 2019-07-29 | End: 2019-07-31

## 2019-07-28 NOTE — PROGRESS NOTES
[facility] - [facility]    Progress Note    Cathren Post Patient Status:  Inpatient    1950 MRN [id number]   Location [facility] 3W/SW Attending Brad Phillips MD   Hosp Day # 3 PCP Radha Nieto MD, Dylan Orlando MD       Subjective:    Wi ALB 3.0 (L) 07/27/2019    ALKPHO 198 (H) 05/29/2019    BILT 0.8 05/29/2019    TP 7.1 05/29/2019    AST 19 05/29/2019    ALT 24 05/29/2019    PTT 40.2 (H) 07/25/2019    INR 1.37 (H) 07/28/2019    TSH 1.530 07/25/2019    PHOS 3.3 07/27/2019

## 2019-07-28 NOTE — PROGRESS NOTES
[facility]    Progress Note    Margie Moreno Patient Status:  Inpatient    1950 MRN [id number]   Location [facility] 3W/SW Attending Tisha Ortiz MD   Hosp Day # 3 PCP Alison Garcia MD, Lilo Carcamo MD        Subjective: (H) 07/28/2019    TSH 1.530 07/25/2019    PHOS 3.3 07/27/2019                        Josy Castano MD, Eliza Lal MD  7/28/2019

## 2019-07-28 NOTE — PROGRESS NOTES
[facility]    Progress Note    Jing Stewart Patient Status:  Inpatient    1950 MRN [id number]   Location [facility] 3W/SW Attending Myla Mcgee MD   Hosp Day # 2 PCP Jordy Reyes MD, Eugene Kent MD        Subjective: PTT 40.2 (H) 07/25/2019    INR 2.25 (H) 07/26/2019    TSH 1.530 07/25/2019    PHOS 3.3 07/27/2019                        Garrick Correia MD, Stephanie Fairbanks MD  7/27/2019

## 2019-07-28 NOTE — PROGRESS NOTES
Patient seen in follow up.  No new co.   07/28/19  0952   BP: 141/50   Pulse: 52   Resp: 18   Temp: 99 °F (37.2 °C)       Intake/Output Summary (Last 24 hours) at 7/28/2019 1400  Last data filed at 7/28/2019 0500  Gross per 24 hour   Intake 660 ml   O 1.       End-stage renal disease with uremic symptoms. 2.       Sinus bradycardia related to beta blocker use. Amiodarone on board.   3.       History of paroxysmal atrial fibrillation with history of cardiovascular accident in the past.  The patient wa

## 2019-07-28 NOTE — CM/SW NOTE
7/28: SUSAN received MDO for outpatient dialysis. Per chart review, Hamida/SUSAN started referral process per patient choice with [facility], referral sent to Intake for review.  Outpatient dialysis is closed on weekend, to follow up on pending referral when offic

## 2019-07-28 NOTE — PROGRESS NOTES
[facility]      Gastroenterology Progress Note    Twyla Colón Patient Status:  Inpatient    1950 MRN [id number]   Location [facility] 3W/SW Attending [address] Roth MD   Hosp Day # 3 PCP Sergo Sneed MD, Farzaneh Muniz MD    CONCLUSION:  1. Unfavorable change from October 2, 2015. 2. Enlarging heart with mild CHF now noted. 3. Postop changes. Dictated by (CST): Von Aaron MD on 7/25/2019 at 14:14     Approved by (CST):  Von Aaron MD on 7/25/2019 at 14:15 bleeding, infection, polyp miss rate, side effect of medication and allergy to medication, and perforation among others. Patient verbalized understanding and agrees to proceed with procedure. - obtain consent  - npo after midnight.      Gordo Serrano DO

## 2019-07-29 ENCOUNTER — ANESTHESIA EVENT (OUTPATIENT)
Dept: ENDOSCOPY | Facility: HOSPITAL | Age: 69
DRG: 264 | End: 2019-07-29
Payer: MEDICARE

## 2019-07-29 ENCOUNTER — ANESTHESIA (OUTPATIENT)
Dept: ENDOSCOPY | Facility: HOSPITAL | Age: 69
DRG: 264 | End: 2019-07-29
Payer: MEDICARE

## 2019-07-29 LAB
ALBUMIN SERPL-MCNC: 2.7 G/DL (ref 3.4–5)
ANION GAP SERPL CALC-SCNC: 9 MMOL/L (ref 0–18)
ANTIBODY SCREEN: NEGATIVE
BASOPHILS # BLD AUTO: 0.02 X10(3) UL (ref 0–0.2)
BASOPHILS NFR BLD AUTO: 0.3 %
BUN BLD-MCNC: 38 MG/DL (ref 7–18)
BUN/CREAT SERPL: 10.2 (ref 10–20)
CALCIUM BLD-MCNC: 8.1 MG/DL (ref 8.5–10.1)
CHLORIDE SERPL-SCNC: 95 MMOL/L (ref 98–112)
CO2 SERPL-SCNC: 28 MMOL/L (ref 21–32)
CREAT BLD-MCNC: 3.71 MG/DL (ref 0.7–1.3)
DEPRECATED RDW RBC AUTO: 50.4 FL (ref 35.1–46.3)
EOSINOPHIL # BLD AUTO: 0.18 X10(3) UL (ref 0–0.7)
EOSINOPHIL NFR BLD AUTO: 2.6 %
ERYTHROCYTE [DISTWIDTH] IN BLOOD BY AUTOMATED COUNT: 16.3 % (ref 11–15)
GLUCOSE BLD-MCNC: 96 MG/DL (ref 70–99)
GLUCOSE BLDC GLUCOMTR-MCNC: 200 MG/DL (ref 70–99)
GLUCOSE BLDC GLUCOMTR-MCNC: 84 MG/DL (ref 70–99)
GLUCOSE BLDC GLUCOMTR-MCNC: 90 MG/DL (ref 70–99)
GLUCOSE BLDC GLUCOMTR-MCNC: 97 MG/DL (ref 70–99)
HCT VFR BLD AUTO: 20.8 % (ref 39–53)
HCT VFR BLD AUTO: 27.8 % (ref 39–53)
HEMOCCULT STL QL: NEGATIVE
HGB BLD-MCNC: 6.7 G/DL (ref 13–17.5)
HGB BLD-MCNC: 9.1 G/DL (ref 13–17.5)
IMM GRANULOCYTES # BLD AUTO: 0.04 X10(3) UL (ref 0–1)
IMM GRANULOCYTES NFR BLD: 0.6 %
INR BLD: 1.3 (ref 0.9–1.2)
LYMPHOCYTES # BLD AUTO: 0.25 X10(3) UL (ref 1–4)
LYMPHOCYTES NFR BLD AUTO: 3.6 %
MCH RBC QN AUTO: 27.2 PG (ref 26–34)
MCHC RBC AUTO-ENTMCNC: 32.2 G/DL (ref 31–37)
MCV RBC AUTO: 84.6 FL (ref 80–100)
MONOCYTES # BLD AUTO: 0.69 X10(3) UL (ref 0.1–1)
MONOCYTES NFR BLD AUTO: 9.9 %
NEUTROPHILS # BLD AUTO: 5.77 X10 (3) UL (ref 1.5–7.7)
NEUTROPHILS # BLD AUTO: 5.77 X10(3) UL (ref 1.5–7.7)
NEUTROPHILS NFR BLD AUTO: 83 %
OSMOLALITY SERPL CALC.SUM OF ELEC: 283 MOSM/KG (ref 275–295)
PHOSPHATE SERPL-MCNC: 2.9 MG/DL (ref 2.5–4.9)
PLATELET # BLD AUTO: 138 10(3)UL (ref 150–450)
POTASSIUM SERPL-SCNC: 3.3 MMOL/L (ref 3.5–5.1)
POTASSIUM SERPL-SCNC: 3.6 MMOL/L (ref 3.5–5.1)
PROTHROMBIN TIME: 16.1 SECONDS (ref 11.8–14.5)
RBC # BLD AUTO: 2.46 X10(6)UL (ref 3.8–5.8)
RH BLOOD TYPE: NEGATIVE
SODIUM SERPL-SCNC: 132 MMOL/L (ref 136–145)
WBC # BLD AUTO: 7 X10(3) UL (ref 4–11)

## 2019-07-29 PROCEDURE — 0DB58ZX EXCISION OF ESOPHAGUS, VIA NATURAL OR ARTIFICIAL OPENING ENDOSCOPIC, DIAGNOSTIC: ICD-10-PCS | Performed by: INTERNAL MEDICINE

## 2019-07-29 PROCEDURE — 85018 HEMOGLOBIN: CPT | Performed by: INTERNAL MEDICINE

## 2019-07-29 PROCEDURE — 85610 PROTHROMBIN TIME: CPT | Performed by: INTERNAL MEDICINE

## 2019-07-29 PROCEDURE — 0DBN8ZZ EXCISION OF SIGMOID COLON, VIA NATURAL OR ARTIFICIAL OPENING ENDOSCOPIC: ICD-10-PCS | Performed by: INTERNAL MEDICINE

## 2019-07-29 PROCEDURE — 85060 BLOOD SMEAR INTERPRETATION: CPT | Performed by: INTERNAL MEDICINE

## 2019-07-29 PROCEDURE — 86900 BLOOD TYPING SEROLOGIC ABO: CPT | Performed by: INTERNAL MEDICINE

## 2019-07-29 PROCEDURE — 82962 GLUCOSE BLOOD TEST: CPT

## 2019-07-29 PROCEDURE — 80069 RENAL FUNCTION PANEL: CPT | Performed by: INTERNAL MEDICINE

## 2019-07-29 PROCEDURE — 85025 COMPLETE CBC W/AUTO DIFF WBC: CPT | Performed by: INTERNAL MEDICINE

## 2019-07-29 PROCEDURE — 84132 ASSAY OF SERUM POTASSIUM: CPT | Performed by: ANESTHESIOLOGY

## 2019-07-29 PROCEDURE — 0DB68ZX EXCISION OF STOMACH, VIA NATURAL OR ARTIFICIAL OPENING ENDOSCOPIC, DIAGNOSTIC: ICD-10-PCS | Performed by: INTERNAL MEDICINE

## 2019-07-29 PROCEDURE — 88312 SPECIAL STAINS GROUP 1: CPT | Performed by: INTERNAL MEDICINE

## 2019-07-29 PROCEDURE — 86920 COMPATIBILITY TEST SPIN: CPT

## 2019-07-29 PROCEDURE — 86901 BLOOD TYPING SEROLOGIC RH(D): CPT | Performed by: INTERNAL MEDICINE

## 2019-07-29 PROCEDURE — 88305 TISSUE EXAM BY PATHOLOGIST: CPT | Performed by: INTERNAL MEDICINE

## 2019-07-29 PROCEDURE — 82272 OCCULT BLD FECES 1-3 TESTS: CPT | Performed by: INTERNAL MEDICINE

## 2019-07-29 PROCEDURE — 90935 HEMODIALYSIS ONE EVALUATION: CPT

## 2019-07-29 PROCEDURE — 86850 RBC ANTIBODY SCREEN: CPT | Performed by: INTERNAL MEDICINE

## 2019-07-29 PROCEDURE — 85014 HEMATOCRIT: CPT | Performed by: INTERNAL MEDICINE

## 2019-07-29 RX ORDER — DEXTROSE MONOHYDRATE 25 G/50ML
50 INJECTION, SOLUTION INTRAVENOUS
Status: DISCONTINUED | OUTPATIENT
Start: 2019-07-29 | End: 2019-07-29 | Stop reason: HOSPADM

## 2019-07-29 RX ORDER — SODIUM CHLORIDE 0.9 % (FLUSH) 0.9 %
10 SYRINGE (ML) INJECTION AS NEEDED
Status: DISCONTINUED | OUTPATIENT
Start: 2019-07-29 | End: 2019-07-31

## 2019-07-29 RX ORDER — NALOXONE HYDROCHLORIDE 0.4 MG/ML
80 INJECTION, SOLUTION INTRAMUSCULAR; INTRAVENOUS; SUBCUTANEOUS AS NEEDED
Status: DISCONTINUED | OUTPATIENT
Start: 2019-07-29 | End: 2019-07-29 | Stop reason: HOSPADM

## 2019-07-29 RX ORDER — SODIUM CHLORIDE 9 MG/ML
INJECTION, SOLUTION INTRAVENOUS ONCE
Status: COMPLETED | OUTPATIENT
Start: 2019-07-29 | End: 2019-07-30

## 2019-07-29 RX ORDER — LIDOCAINE HYDROCHLORIDE 20 MG/ML
INJECTION, SOLUTION EPIDURAL; INFILTRATION; INTRACAUDAL; PERINEURAL AS NEEDED
Status: DISCONTINUED | OUTPATIENT
Start: 2019-07-29 | End: 2019-07-29 | Stop reason: SURG

## 2019-07-29 RX ORDER — SODIUM CHLORIDE, SODIUM LACTATE, POTASSIUM CHLORIDE, CALCIUM CHLORIDE 600; 310; 30; 20 MG/100ML; MG/100ML; MG/100ML; MG/100ML
INJECTION, SOLUTION INTRAVENOUS CONTINUOUS
Status: DISCONTINUED | OUTPATIENT
Start: 2019-07-29 | End: 2019-07-31

## 2019-07-29 RX ADMIN — SODIUM CHLORIDE: 9 INJECTION, SOLUTION INTRAVENOUS at 15:03:00

## 2019-07-29 RX ADMIN — LIDOCAINE HYDROCHLORIDE 40 MG: 20 INJECTION, SOLUTION EPIDURAL; INFILTRATION; INTRACAUDAL; PERINEURAL at 14:15:00

## 2019-07-29 RX ADMIN — SODIUM CHLORIDE: 9 INJECTION, SOLUTION INTRAVENOUS at 14:12:00

## 2019-07-29 NOTE — ANESTHESIA POSTPROCEDURE EVALUATION
Patient: Jorge Anglin    Procedure Summary     Date:  07/29/19 Room / Location:  [facility] ENDOSCOPY 05 / [facility] ENDOSCOPY    Anesthesia Start:  8810 Anesthesia Stop:  8345    Procedures:       ESOPHAGOGASTRODUODENOSCOPY (EGD) (N/A )      COLONOSCOPY (N/A ) Kaia Poole

## 2019-07-29 NOTE — OPERATIVE REPORT
EGD/Colonoscopy Operative Report    Pre-Operative Diagnosis: acute anemia    Post-Operative Diagnosis:   -Atrophic appearing gastric mucosa, biopsies were taken   -[place] colored tongue at Z-line and biopsies take valve. Careful examination of the entire colon was performed during withdrawal of the endoscope. The scope was withdrawn to the rectum and retroflexion was performed. The patient tolerated the procedure well with no immediate complications.  The patient wa have been missed due to prep and this can be done as outpatient.   -Will check vitamin B12 given ?atrophic gastritis   -Trend H/H and can restart coumadin tomorrow if stable   -Start clear liquid diet and advance as tolerated   -anusol cream BID x 7 days f

## 2019-07-29 NOTE — PROGRESS NOTES
[place] [facility] - [facility]    Progress Note    Francis Corbin Patient Status:  Inpatient    1950 MRN [id number]   Location [facility] 3W/SW Attending Manuel Tilley MD   Hosp Day # 4 PCP Tsering Joshua MD, Iza Alvarado MD       Subjective:    Wi 05/29/2019    ALT 24 05/29/2019    PTT 40.2 (H) 07/25/2019    INR 1.30 (H) 07/29/2019    TSH 1.530 07/25/2019    PHOS 2.9 07/29/2019                   Rene Bob MD  7/29/2019

## 2019-07-29 NOTE — PROGRESS NOTES
Patient seen in follow up.  No CP or SOB.   07/29/19  1613   BP: (!) 171/58   Pulse: 57   Resp: 15   Temp: 97.6 °F (36.4 °C)       Intake/Output Summary (Last 24 hours) at 7/29/2019 1616  Last data filed at 7/29/2019 1503  Gross per 24 hour   Intake 2 epoetin morris-epbx (RETACRIT) 3000 UNIT/ML injection SOLN 3,000 Units 3,000 Units Intravenous Once per day on Mon Wed Fri   atorvastatin (LIPITOR) tab 80 mg 80 mg Oral Nightly   furosemide (LASIX) tab 40 mg 40 mg Oral Daily   dextrose 50 % injection 50 mL 5 Thank you for allowing me to participate in the care of your patient. please call if you have any question.      Yahir Dee MD   General Cardiology & Advanced Heart Failure, Cardiac Transplant and Assisted Devices  [facility]  Pager: [phone]

## 2019-07-29 NOTE — PROGRESS NOTES
[facility]    Progress Note    David Madrigal Patient Status:  Inpatient    1950 MRN [id number]   Location [facility] 3W/SW Attending Lc Baxter MD   Hosp Day # 4 PCP Josy Castano MD, Eliza Lal MD        Subjective: 1.530 07/25/2019    PHOS 2.9 07/29/2019                        Russell Nova MD, Asiya Wiseman MD  7/29/2019

## 2019-07-29 NOTE — OPERATIVE REPORT
[facility]    PATIENT'S NAME: Lindaann Riedel D   ATTENDING PHYSICIAN: Frank Bach MD   OPERATING PHYSICIAN: Guerline Buckley MD   PATIENT ACCOUNT#:   [id number]    LOCATION:  3W [address] [facility] #:   [id number]       [facility] Medications: medications verified and updated  Added preferred pharmacy    Physician requesting consult:   Pediatrician: Rehan Hernandez MD    CC: Genaro Gustafson and presents today for   Chief Complaint   Patient presents with   • Ear Problem     NP Acute swimmer's ear of right side. urgent care last week and they stated he had a lot of depries in his ear. They recommended to come to ENT.       Past Medical History:   Diagnosis Date   • Tonsillitis      Past Surgical History:   Procedure Laterality Date   • Adenoidectomy w/ myringotomy and tubes     • Circumcision, primary     • Tonsillectomy and adenoidectomy  3/18/16     Outpatient Encounter Prescriptions as of 9/12/2018   Medication Sig Dispense Refill   • ofloxacin (FLOXIN) 0.3 % otic solution Place 5 drops into right ear daily for 7 days. 5 mL 0   • Pediatric Multivit-Minerals-C (MULTIVITAMIN GUMMIES CHILDRENS) Chew Tab Chew 1 tablet by mouth daily.     • albuterol (VENTOLIN) (2.5 MG/3ML) 0.083% nebulizer solution Take 3 mLs by nebulization every 6 hours as needed for Wheezing. 75 mL 6     No facility-administered encounter medications on file as of 9/12/2018.      ALLERGIES: no known allergies.    Social History:   Does child attend : No  Does anyone smoke in the house?No    Systems Review:   In the PAST 2 weeks, has your child experienced any NEW symptoms listed below?  Constitutional:  Fatigue/Low energy: No   Psychological:  Sleep problems: No  Skin:  Eczema/dry skin: No  Eyes: Vision problems: No  Neurological:  Headaches: No  Ears:  Hearing Loss: No  Respiratory:  Cough: No  Gastrointestinal:  Reflux/heartburn: No  Musculoskeletal:  Joint Pain: No  Hematologic:  Easy Bruising: No    Has child ever had (MRSA) or a serious skin infection? No  Does your child have a Latex Allergy?  No    Family History:   Does anyone in child's immediate family have:   Hearing Loss: No  Allergies/Hay fever: No   Anesthesia Complications: No  Bleeding Disorders:  No         including bleeding, infection, nerve damage, scarring, and need for further operation were discussed with the patient and family and the decision was made to proceed.       OPERATIVE TECHNIQUE:  The patient was identified in the preoperative area, marked, b careful the vein distended we brought it out from lateral to medial so that it could come off the brachial artery and the radial artery branch. I had Anesthesia give overall 3000 units of heparin IV.   We clamped the brachial artery and this accessory circ

## 2019-07-29 NOTE — PLAN OF CARE
Golytely prep initiated early afternoon but unable to tolerate further by 0130. Pt c/o stomach pain with golytely. + loose yellow stools at this time. NPO this AM. Plan for EGD/Colonoscopy and HD in AM.     0630 - Hgb 6.7 this AM. Dr Guilherme Whiteside notified.  Order Long Term Goal: Get stronger and back to home    Interventions:  - Dialysis  - Monitor labs and VS  - See additional Care Plan goals for specific interventions   Outcome: Progressing  Goal: Patient/Family Short Term Goal  Description  Patient's Short Term including labs, urine output, blood pressure (other measures as available)  - Encourage oral intake as appropriate  - Instruct patient on fluid and nutrition restrictions as appropriate  Outcome: Progressing     Problem: HEMATOLOGIC - ADULT  Goal: Maintain

## 2019-07-29 NOTE — CM/SW NOTE
7/32 - 151pm:  Fredy Ramos from [facility] informed SW that [address] is the only available chair at this time. [facility] recommended pt to follow up w/ [facility] to request alternative time in the future. SUSAN updated RN/Corinne.  Plan for discharge today, start outpatient HD at

## 2019-07-29 NOTE — ANESTHESIA PREPROCEDURE EVALUATION
Anesthesia PreOp Note    HPI:     Starla Esparza is a 71year old male who presents for preoperative consultation requested by: Irena Fox MD    Date of Surgery: 7/25/2019 - 7/29/2019    Procedure(s):  ESOPHAGOGASTRODUODENOSCOPY (EGD)  COLONOSCOPY Eusebio Engel MD    heparin sodium 1000 UNIT/ML injection 1,500 Units 1.5 mL Intravenous Once Eusebio Engel MD    ondansetron HCl (ZOFRAN) injection 4 mg 4 mg Intravenous Q6H PRN Claire Macedo MD 4 mg at 07/27/19 0639   Albumin Human (ALBUMINA Highest education level: Not on file    Occupational History      Not on file    Social Needs      Financial resource strain: Not on file      Food insecurity:        Worry: Not on file        Inability: Not on file      Transportation needs:        Hyacinth Baum kg/m². height is 1.676 m (5' 6\") and weight is 77.8 kg (171 lb 8 oz). His oral temperature is 97.7 °F (36.5 °C). His blood pressure is 160/59 and his pulse is 57.  His respiration is 13 and oxygen saturation is 97%.    07/29/19  0913 07/29/19  0927 07/29

## 2019-07-30 ENCOUNTER — APPOINTMENT (OUTPATIENT)
Dept: INTERVENTIONAL RADIOLOGY/VASCULAR | Facility: HOSPITAL | Age: 69
DRG: 264 | End: 2019-07-30
Attending: INTERNAL MEDICINE
Payer: MEDICARE

## 2019-07-30 LAB
BLOOD TYPE BARCODE: 9500
GLUCOSE BLDC GLUCOMTR-MCNC: 102 MG/DL (ref 70–99)
GLUCOSE BLDC GLUCOMTR-MCNC: 106 MG/DL (ref 70–99)
GLUCOSE BLDC GLUCOMTR-MCNC: 184 MG/DL (ref 70–99)
GLUCOSE BLDC GLUCOMTR-MCNC: 211 MG/DL (ref 70–99)
VIT B12 SERPL-MCNC: 551 PG/ML (ref 193–986)

## 2019-07-30 PROCEDURE — 82607 VITAMIN B-12: CPT | Performed by: INTERNAL MEDICINE

## 2019-07-30 PROCEDURE — 99152 MOD SED SAME PHYS/QHP 5/>YRS: CPT

## 2019-07-30 PROCEDURE — 36558 INSERT TUNNELED CV CATH: CPT

## 2019-07-30 PROCEDURE — 02HV33Z INSERTION OF INFUSION DEVICE INTO SUPERIOR VENA CAVA, PERCUTANEOUS APPROACH: ICD-10-PCS | Performed by: RADIOLOGY

## 2019-07-30 PROCEDURE — 0JH63XZ INSERTION OF TUNNELED VASCULAR ACCESS DEVICE INTO CHEST SUBCUTANEOUS TISSUE AND FASCIA, PERCUTANEOUS APPROACH: ICD-10-PCS | Performed by: RADIOLOGY

## 2019-07-30 PROCEDURE — 82962 GLUCOSE BLOOD TEST: CPT

## 2019-07-30 PROCEDURE — 77001 FLUOROGUIDE FOR VEIN DEVICE: CPT

## 2019-07-30 PROCEDURE — B5181ZA FLUOROSCOPY OF SUPERIOR VENA CAVA USING LOW OSMOLAR CONTRAST, GUIDANCE: ICD-10-PCS | Performed by: RADIOLOGY

## 2019-07-30 RX ORDER — MIDAZOLAM HYDROCHLORIDE 1 MG/ML
INJECTION INTRAMUSCULAR; INTRAVENOUS
Status: COMPLETED
Start: 2019-07-30 | End: 2019-07-30

## 2019-07-30 RX ORDER — HEPARIN SODIUM 1000 [USP'U]/ML
INJECTION, SOLUTION INTRAVENOUS; SUBCUTANEOUS
Status: COMPLETED
Start: 2019-07-30 | End: 2019-07-30

## 2019-07-30 RX ORDER — ALBUMIN (HUMAN) 12.5 G/50ML
100 SOLUTION INTRAVENOUS AS NEEDED
Status: DISCONTINUED | OUTPATIENT
Start: 2019-07-31 | End: 2019-07-31

## 2019-07-30 RX ORDER — LIDOCAINE HYDROCHLORIDE 20 MG/ML
INJECTION, SOLUTION EPIDURAL; INFILTRATION; INTRACAUDAL; PERINEURAL
Status: COMPLETED
Start: 2019-07-30 | End: 2019-07-30

## 2019-07-30 RX ORDER — HEPARIN SODIUM 1000 [USP'U]/ML
1.5 INJECTION, SOLUTION INTRAVENOUS; SUBCUTANEOUS ONCE
Status: DISCONTINUED | OUTPATIENT
Start: 2019-07-31 | End: 2019-07-31

## 2019-07-30 RX ORDER — SODIUM CHLORIDE 9 MG/ML
INJECTION, SOLUTION INTRAVENOUS
Status: DISPENSED
Start: 2019-07-30 | End: 2019-07-30

## 2019-07-30 RX ORDER — CEFAZOLIN SODIUM/WATER 2 G/20 ML
SYRINGE (ML) INTRAVENOUS
Status: COMPLETED
Start: 2019-07-30 | End: 2019-07-30

## 2019-07-30 NOTE — PLAN OF CARE
Problem: CARDIOVASCULAR - ADULT  Goal: Maintains optimal cardiac output and hemodynamic stability  Description  INTERVENTIONS:  - Monitor vital signs, rhythm, and trends  - Monitor for bleeding, hypotension and signs of decreased cardiac output  - Evalua as ordered and appropriate  - Administer supportive blood products/factors as ordered and appropriate  Outcome: Progressing  Goal: Free from bleeding injury  Description  (Example usage: patient with low platelets)  INTERVENTIONS:  - Avoid intramuscular in

## 2019-07-30 NOTE — PROGRESS NOTES
Go Jacobsen  [id number]  7/30/2019    Post procedure/ recovery hand-off report given to Renate Sexton. Patient's vital signs are stable. Procedural  access site is  dry and intact with no signs and symptoms of bleeding/ hematoma.     Solo Flroes RN

## 2019-07-30 NOTE — PROGRESS NOTES
[facility]    Emmanuel Alaniz  [id number]  6/22/1950    No ref.  provider found    24 hour events   -EGD and colonoscopy without evidence of GI bleed     MEDICAL HISTORY:     Past Medical History: Intravenous Once per day on Mon Wed Fri   atorvastatin (LIPITOR) tab 80 mg 80 mg Oral Nightly   furosemide (LASIX) tab 40 mg 40 mg Oral Daily   dextrose 50 % injection 50 mL 50 mL Intravenous PRN   hydrALAZINE HCl (APRESOLINE) tab 100 mg 100 mg Oral TID INR 1.30 (H) 07/29/2019 05:27 AM     Lab Results   Component Value Date/Time    TSH 1.530 07/25/2019 01:25 PM     EGD and colonoscopy 7/29/19  -Atrophic appearing gastric mucosa, biopsies were taken   -[place] colored tongue at Z-line and biopsies taken

## 2019-07-30 NOTE — PROGRESS NOTES
Called to schedule Dialysis with [facility] for 7/31, spoke to Cite Tyrell Olvera. Scheduled for the morning in case pt may discharge after.

## 2019-07-30 NOTE — PLAN OF CARE
Problem: Patient Centered Care  Goal: Patient preferences are identified and integrated in the patient's plan of care  Description  Interventions:  - What would you like us to know as we care for you? From home with family.  Update patient on care plan medications to optimize hemodynamic stability  - Monitor arterial and/or venous puncture sites for bleeding and/or hematoma  - Assess quality of pulses, skin color and temperature  - Assess for signs of decreased coronary artery perfusion - ex.  Angina  - E medication administration  - Ensure safe mobilization of patient  - Hold pressure on venipuncture sites to achieve adequate hemostasis  - Assess for signs and symptoms of internal bleeding  - Monitor lab trends  Outcome: Progressing

## 2019-07-30 NOTE — PROGRESS NOTES
[facility]    Progress Note    Sohail Triplett Patient Status:  Inpatient    1950 MRN [id number]   Location [facility] 3W/SW Attending Eliceo James MD   Hosp Day # 5 PCP Willard Campuzano MD, Mehdi Huerta MD        Subjective: 0.8 05/29/2019    TP 7.1 05/29/2019    AST 19 05/29/2019    ALT 24 05/29/2019    PTT 40.2 (H) 07/25/2019    INR 1.30 (H) 07/29/2019    TSH 1.530 07/25/2019    PHOS 2.9 07/29/2019    B12 551 07/30/2019       Ir Tunneled Cv Cath Insertion Exchange Check    R

## 2019-07-30 NOTE — PROGRESS NOTES
Patient seen in follow up.  No CP or SOB.   07/30/19  1300   BP: 156/52   Pulse: 52   Resp: 14   Temp:        Intake/Output Summary (Last 24 hours) at 7/30/2019 1327  Last data filed at 7/30/2019 1100  Gross per 24 hour   Intake 270 ml   Output 0 ml Isosorbide Mononitrate ER (IMDUR) 24 hr tab 60 mg 60 mg Oral Daily       No medications prior to admission. Ir Tunneled Cv Cath Insertion Exchange Check    Result Date: 7/30/2019  CONCLUSION: Tunneled permacath placement for hemodialysis.      Dictated by

## 2019-07-30 NOTE — IVS NOTE
Inpatient Throughput Communication:    Called inpatient RN Jacey Strauss and notified of scheduled procedure Tunneled Cath Insertion on 7/30/2019. Verified that appropriate consent is signed: Yes  Appropriate Consent Signed:  Yes  Access Site Hair Clipped an

## 2019-07-30 NOTE — PROGRESS NOTES
[facility]    Progress Note    Sam Lazaro Patient Status:  Inpatient    1950 MRN [id number]   Location [facility] 3W/SW Attending Angelina Mohan MD   Hosp Day # 5 PCP Lyn Mohamud MD, Stefano Cline MD       Subjective:    Wi PHOS 2.9 07/29/2019    B12 551 07/30/2019                   Sandy Cast MD  7/30/2019

## 2019-07-31 VITALS
HEIGHT: 66 IN | DIASTOLIC BLOOD PRESSURE: 62 MMHG | HEART RATE: 63 BPM | BODY MASS INDEX: 26.74 KG/M2 | SYSTOLIC BLOOD PRESSURE: 163 MMHG | WEIGHT: 166.38 LBS | TEMPERATURE: 98 F | OXYGEN SATURATION: 96 % | RESPIRATION RATE: 16 BRPM

## 2019-07-31 LAB
ALBUMIN SERPL-MCNC: 2.7 G/DL (ref 3.4–5)
ANION GAP SERPL CALC-SCNC: 10 MMOL/L (ref 0–18)
BASOPHILS # BLD AUTO: 0.02 X10(3) UL (ref 0–0.2)
BASOPHILS NFR BLD AUTO: 0.3 %
BUN BLD-MCNC: 37 MG/DL (ref 7–18)
BUN/CREAT SERPL: 7.8 (ref 10–20)
CALCIUM BLD-MCNC: 7.9 MG/DL (ref 8.5–10.1)
CHLORIDE SERPL-SCNC: 97 MMOL/L (ref 98–112)
CO2 SERPL-SCNC: 24 MMOL/L (ref 21–32)
CREAT BLD-MCNC: 4.73 MG/DL (ref 0.7–1.3)
DEPRECATED RDW RBC AUTO: 50.2 FL (ref 35.1–46.3)
EOSINOPHIL # BLD AUTO: 0.14 X10(3) UL (ref 0–0.7)
EOSINOPHIL NFR BLD AUTO: 2.4 %
ERYTHROCYTE [DISTWIDTH] IN BLOOD BY AUTOMATED COUNT: 16.8 % (ref 11–15)
GLUCOSE BLD-MCNC: 138 MG/DL (ref 70–99)
GLUCOSE BLDC GLUCOMTR-MCNC: 107 MG/DL (ref 70–99)
HCT VFR BLD AUTO: 25.1 % (ref 39–53)
HGB BLD-MCNC: 8.2 G/DL (ref 13–17.5)
IMM GRANULOCYTES # BLD AUTO: 0.04 X10(3) UL (ref 0–1)
IMM GRANULOCYTES NFR BLD: 0.7 %
LYMPHOCYTES # BLD AUTO: 0.24 X10(3) UL (ref 1–4)
LYMPHOCYTES NFR BLD AUTO: 4 %
MCH RBC QN AUTO: 27.6 PG (ref 26–34)
MCHC RBC AUTO-ENTMCNC: 32.7 G/DL (ref 31–37)
MCV RBC AUTO: 84.5 FL (ref 80–100)
MONOCYTES # BLD AUTO: 0.79 X10(3) UL (ref 0.1–1)
MONOCYTES NFR BLD AUTO: 13.3 %
NEUTROPHILS # BLD AUTO: 4.72 X10 (3) UL (ref 1.5–7.7)
NEUTROPHILS # BLD AUTO: 4.72 X10(3) UL (ref 1.5–7.7)
NEUTROPHILS NFR BLD AUTO: 79.3 %
OSMOLALITY SERPL CALC.SUM OF ELEC: 283 MOSM/KG (ref 275–295)
PHOSPHATE SERPL-MCNC: 2.3 MG/DL (ref 2.5–4.9)
PLATELET # BLD AUTO: 157 10(3)UL (ref 150–450)
POTASSIUM SERPL-SCNC: 3.5 MMOL/L (ref 3.5–5.1)
RBC # BLD AUTO: 2.97 X10(6)UL (ref 3.8–5.8)
SODIUM SERPL-SCNC: 131 MMOL/L (ref 136–145)
WBC # BLD AUTO: 6 X10(3) UL (ref 4–11)

## 2019-07-31 PROCEDURE — 90935 HEMODIALYSIS ONE EVALUATION: CPT

## 2019-07-31 PROCEDURE — 82962 GLUCOSE BLOOD TEST: CPT

## 2019-07-31 PROCEDURE — 85025 COMPLETE CBC W/AUTO DIFF WBC: CPT | Performed by: INTERNAL MEDICINE

## 2019-07-31 PROCEDURE — 80069 RENAL FUNCTION PANEL: CPT | Performed by: INTERNAL MEDICINE

## 2019-07-31 RX ORDER — AMIODARONE HYDROCHLORIDE 100 MG/1
100 TABLET ORAL NIGHTLY
Qty: 30 TABLET | Refills: 0 | Status: SHIPPED | OUTPATIENT
Start: 2019-07-31

## 2019-07-31 RX ORDER — ISOSORBIDE MONONITRATE 60 MG/1
60 TABLET, EXTENDED RELEASE ORAL DAILY
Qty: 30 TABLET | Refills: 1 | Status: SHIPPED | OUTPATIENT
Start: 2019-07-31

## 2019-07-31 RX ORDER — HYDRALAZINE HYDROCHLORIDE 100 MG/1
100 TABLET, FILM COATED ORAL 3 TIMES DAILY
Qty: 30 TABLET | Refills: 0 | Status: SHIPPED | OUTPATIENT
Start: 2019-07-31

## 2019-07-31 RX ORDER — FUROSEMIDE 40 MG/1
40 TABLET ORAL DAILY
Qty: 30 TABLET | Refills: 0 | Status: SHIPPED | OUTPATIENT
Start: 2019-07-31

## 2019-07-31 NOTE — PLAN OF CARE
Problem: Patient Centered Care  Goal: Patient preferences are identified and integrated in the patient's plan of care  Description  Interventions:  - What would you like us to know as we care for you? From home with family.  Update patient on care plan medications to optimize hemodynamic stability  - Monitor arterial and/or venous puncture sites for bleeding and/or hematoma  - Assess quality of pulses, skin color and temperature  - Assess for signs of decreased coronary artery perfusion - ex.  Angina  - E medication administration  - Ensure safe mobilization of patient  - Hold pressure on venipuncture sites to achieve adequate hemostasis  - Assess for signs and symptoms of internal bleeding  - Monitor lab trends  Outcome: Progressing     Patient went to [facility]

## 2019-07-31 NOTE — DISCHARGE SUMMARY
[facility]    Discharge Summary    Margarita Pino Patient Status:  Inpatient    1950 MRN [id number]   Location [facility] 3W/SW Attending Mandy Arora MD   Hosp Day # 6 PCP Mohini Whittington MD, Minh Steven MD     Date of Admiss [id number] 7/29/19 ESOPHAGOGASTRODUODENOSCOPY (EGD) Sudha La MD [facility] ENDOSCOPY Comp            Discharge Plan:   Discharge Condition: Good    Current Discharge Medication List    Home Meds - Modified    amiodarone HCl 100 MG Oral Tab  Take 1 tablet (100 m

## 2019-07-31 NOTE — DISCHARGE PLANNING
Patient and son Alicia Rincon were provided with discharge instructions, education, and follow up information.  Prescriptions were faxed to patient's pharmacy at his request. Patient verbalizes understanding of follow up information, specifically care for tunneled h

## 2019-07-31 NOTE — PROGRESS NOTES
[facility]    Progress Note    [facility] Patient Status:  Inpatient    1950 MRN [id number]   Location [facility] 3W/SW Attending Sriram Yanes MD   Hosp Day # 6 PCP Cornelia Sellers MD, Dannie Abad MD       Subjective:    Wi 07/25/2019    INR 1.30 (H) 07/29/2019    TSH 1.530 07/25/2019    PHOS 2.3 (L) 07/31/2019    B12 551 07/30/2019       Ir Tunneled Cv Cath Insertion Exchange Check    Result Date: 7/30/2019  CONCLUSION: Tunneled permacath placement for hemodialysis.      Dict

## 2019-08-21 ENCOUNTER — LAB ENCOUNTER (OUTPATIENT)
Dept: LAB | Facility: HOSPITAL | Age: 69
End: 2019-08-21
Attending: INTERNAL MEDICINE
Payer: MEDICARE

## 2019-08-21 DIAGNOSIS — R60.9 EDEMA: ICD-10-CM

## 2019-08-21 DIAGNOSIS — D64.9 ANEMIA: ICD-10-CM

## 2019-08-21 DIAGNOSIS — I10 ESSENTIAL HYPERTENSION, MALIGNANT: ICD-10-CM

## 2019-08-21 DIAGNOSIS — N18.4 CHRONIC KIDNEY DISEASE, STAGE IV (SEVERE) (HCC): Primary | ICD-10-CM

## 2019-08-21 LAB
ALBUMIN SERPL-MCNC: 3.8 G/DL (ref 3.4–5)
ANION GAP SERPL CALC-SCNC: 10 MMOL/L (ref 0–18)
BUN BLD-MCNC: 19 MG/DL (ref 7–18)
BUN/CREAT SERPL: 6.8 (ref 10–20)
CALCIUM BLD-MCNC: 9.4 MG/DL (ref 8.5–10.1)
CHLORIDE SERPL-SCNC: 98 MMOL/L (ref 98–112)
CO2 SERPL-SCNC: 28 MMOL/L (ref 21–32)
CREAT BLD-MCNC: 2.81 MG/DL (ref 0.7–1.3)
DEPRECATED HBV CORE AB SER IA-ACNC: 258.1 NG/ML (ref 30–530)
GLUCOSE BLD-MCNC: 144 MG/DL (ref 70–99)
HBV CORE AB SERPL QL IA: NONREACTIVE
HBV SURFACE AB SER QL: NONREACTIVE
HBV SURFACE AB SERPL IA-ACNC: <3.1 MIU/ML
HBV SURFACE AG SER-ACNC: 0.22 [IU]/L
HBV SURFACE AG SERPL QL IA: NONREACTIVE
HCT VFR BLD AUTO: 44.1 % (ref 39–53)
HGB BLD-MCNC: 13.6 G/DL (ref 13–17.5)
IRON SATURATION: 20 % (ref 20–50)
IRON SERPL-MCNC: 66 UG/DL (ref 65–175)
OSMOLALITY SERPL CALC.SUM OF ELEC: 287 MOSM/KG (ref 275–295)
PHOSPHATE SERPL-MCNC: 2.9 MG/DL (ref 2.5–4.9)
POTASSIUM SERPL-SCNC: 4.5 MMOL/L (ref 3.5–5.1)
PTH-INTACT SERPL-MCNC: 337.4 PG/ML (ref 18.5–88)
SODIUM SERPL-SCNC: 136 MMOL/L (ref 136–145)
TOTAL IRON BINDING CAPACITY: 331 UG/DL (ref 240–450)
TRANSFERRIN SERPL-MCNC: 222 MG/DL (ref 200–360)

## 2019-08-21 PROCEDURE — 85014 HEMATOCRIT: CPT

## 2019-08-21 PROCEDURE — 80069 RENAL FUNCTION PANEL: CPT

## 2019-08-21 PROCEDURE — 83970 ASSAY OF PARATHORMONE: CPT

## 2019-08-21 PROCEDURE — 36415 COLL VENOUS BLD VENIPUNCTURE: CPT

## 2019-08-21 PROCEDURE — 87340 HEPATITIS B SURFACE AG IA: CPT

## 2019-08-21 PROCEDURE — 86706 HEP B SURFACE ANTIBODY: CPT

## 2019-08-21 PROCEDURE — 86704 HEP B CORE ANTIBODY TOTAL: CPT

## 2019-08-21 PROCEDURE — 82728 ASSAY OF FERRITIN: CPT

## 2019-08-21 PROCEDURE — 83540 ASSAY OF IRON: CPT

## 2019-08-21 PROCEDURE — 85018 HEMOGLOBIN: CPT

## 2019-08-21 PROCEDURE — 84466 ASSAY OF TRANSFERRIN: CPT

## 2019-09-12 ENCOUNTER — LAB ENCOUNTER (OUTPATIENT)
Dept: LAB | Facility: HOSPITAL | Age: 69
End: 2019-09-12
Attending: INTERNAL MEDICINE
Payer: MEDICARE

## 2019-09-12 DIAGNOSIS — I82.409 DVT (DEEP VENOUS THROMBOSIS) (HCC): Primary | ICD-10-CM

## 2019-09-12 LAB
INR BLD: 2.39 (ref 0.9–1.2)
PROTHROMBIN TIME: 26.4 SECONDS (ref 11.8–14.5)

## 2019-09-12 PROCEDURE — 36415 COLL VENOUS BLD VENIPUNCTURE: CPT

## 2019-09-12 PROCEDURE — 85610 PROTHROMBIN TIME: CPT

## 2019-10-11 ENCOUNTER — LAB ENCOUNTER (OUTPATIENT)
Dept: LAB | Facility: HOSPITAL | Age: 69
End: 2019-10-11
Attending: INTERNAL MEDICINE
Payer: MEDICARE

## 2019-10-11 DIAGNOSIS — R03.0 ELEVATED BLOOD PRESSURE READING WITHOUT DIAGNOSIS OF HYPERTENSION: ICD-10-CM

## 2019-10-11 DIAGNOSIS — E11.9 DIABETES MELLITUS (HCC): Primary | ICD-10-CM

## 2019-10-11 DIAGNOSIS — E78.5 HYPERLIPEMIA: ICD-10-CM

## 2019-10-11 DIAGNOSIS — I48.91 A-FIB (HCC): ICD-10-CM

## 2019-10-11 DIAGNOSIS — I25.10 CORONARY ATHEROSCLEROSIS OF NATIVE CORONARY ARTERY: ICD-10-CM

## 2019-10-11 PROCEDURE — 80061 LIPID PANEL: CPT

## 2019-10-11 PROCEDURE — 85610 PROTHROMBIN TIME: CPT

## 2019-10-11 PROCEDURE — 85025 COMPLETE CBC W/AUTO DIFF WBC: CPT

## 2019-10-11 PROCEDURE — 83036 HEMOGLOBIN GLYCOSYLATED A1C: CPT

## 2019-10-11 PROCEDURE — 80053 COMPREHEN METABOLIC PANEL: CPT

## 2019-10-11 PROCEDURE — 36415 COLL VENOUS BLD VENIPUNCTURE: CPT

## 2019-11-13 RX ORDER — CARVEDILOL 6.25 MG/1
6.25 TABLET ORAL 2 TIMES DAILY WITH MEALS
COMMUNITY

## 2019-11-13 RX ORDER — WARFARIN SODIUM 3 MG/1
3 TABLET ORAL DAILY
Status: ON HOLD | COMMUNITY
End: 2019-12-24 | Stop reason: ALTCHOICE

## 2019-11-13 RX ORDER — ATORVASTATIN CALCIUM 80 MG/1
80 TABLET, FILM COATED ORAL NIGHTLY
Status: ON HOLD | COMMUNITY
End: 2019-11-25

## 2019-11-13 RX ORDER — GLIMEPIRIDE 2 MG/1
2 TABLET ORAL
Status: ON HOLD | COMMUNITY
End: 2019-11-28

## 2019-11-13 RX ORDER — SPIRONOLACTONE 25 MG/1
25 TABLET ORAL DAILY
COMMUNITY

## 2019-11-15 ENCOUNTER — LAB ENCOUNTER (OUTPATIENT)
Dept: LAB | Facility: HOSPITAL | Age: 69
End: 2019-11-15
Attending: INTERNAL MEDICINE
Payer: MEDICARE

## 2019-11-15 DIAGNOSIS — Z01.818 PREOP TESTING: ICD-10-CM

## 2019-11-15 DIAGNOSIS — I48.91 A-FIB (HCC): ICD-10-CM

## 2019-11-15 PROCEDURE — 86900 BLOOD TYPING SEROLOGIC ABO: CPT

## 2019-11-15 PROCEDURE — 36415 COLL VENOUS BLD VENIPUNCTURE: CPT

## 2019-11-15 PROCEDURE — 86901 BLOOD TYPING SEROLOGIC RH(D): CPT

## 2019-11-15 PROCEDURE — 86850 RBC ANTIBODY SCREEN: CPT

## 2019-11-15 PROCEDURE — 85610 PROTHROMBIN TIME: CPT

## 2019-11-16 ENCOUNTER — ANESTHESIA (OUTPATIENT)
Dept: SURGERY | Facility: HOSPITAL | Age: 69
End: 2019-11-16
Payer: MEDICARE

## 2019-11-16 ENCOUNTER — HOSPITAL ENCOUNTER (OUTPATIENT)
Facility: HOSPITAL | Age: 69
Discharge: HOME | End: 2019-11-16
Attending: SURGERY | Admitting: SURGERY
Payer: MEDICARE

## 2019-11-16 ENCOUNTER — ANESTHESIA EVENT (OUTPATIENT)
Dept: SURGERY | Facility: HOSPITAL | Age: 69
End: 2019-11-16
Payer: MEDICARE

## 2019-11-16 VITALS
DIASTOLIC BLOOD PRESSURE: 56 MMHG | BODY MASS INDEX: 25.39 KG/M2 | HEART RATE: 52 BPM | RESPIRATION RATE: 14 BRPM | OXYGEN SATURATION: 97 % | SYSTOLIC BLOOD PRESSURE: 129 MMHG | HEIGHT: 66 IN | WEIGHT: 158 LBS | TEMPERATURE: 98 F

## 2019-11-16 DIAGNOSIS — Z01.818 PREOP TESTING: Primary | ICD-10-CM

## 2019-11-16 PROBLEM — T82.868A AV FISTULA THROMBOSIS (HCC): Status: ACTIVE | Noted: 2019-11-16

## 2019-11-16 PROCEDURE — 82962 GLUCOSE BLOOD TEST: CPT

## 2019-11-16 PROCEDURE — 05CF0ZZ EXTIRPATION OF MATTER FROM LEFT CEPHALIC VEIN, OPEN APPROACH: ICD-10-PCS | Performed by: SURGERY

## 2019-11-16 PROCEDURE — 84132 ASSAY OF SERUM POTASSIUM: CPT | Performed by: SURGERY

## 2019-11-16 PROCEDURE — 03180ZD BYPASS LEFT BRACHIAL ARTERY TO UPPER ARM VEIN, OPEN APPROACH: ICD-10-PCS | Performed by: SURGERY

## 2019-11-16 PROCEDURE — 36415 COLL VENOUS BLD VENIPUNCTURE: CPT | Performed by: SURGERY

## 2019-11-16 PROCEDURE — 03780ZZ DILATION OF LEFT BRACHIAL ARTERY, OPEN APPROACH: ICD-10-PCS | Performed by: SURGERY

## 2019-11-16 PROCEDURE — 85610 PROTHROMBIN TIME: CPT | Performed by: SURGERY

## 2019-11-16 RX ORDER — ONDANSETRON 2 MG/ML
INJECTION INTRAMUSCULAR; INTRAVENOUS AS NEEDED
Status: DISCONTINUED | OUTPATIENT
Start: 2019-11-16 | End: 2019-11-16 | Stop reason: SURG

## 2019-11-16 RX ORDER — METOPROLOL TARTRATE 5 MG/5ML
2.5 INJECTION INTRAVENOUS ONCE
Status: DISCONTINUED | OUTPATIENT
Start: 2019-11-16 | End: 2019-11-16 | Stop reason: HOSPADM

## 2019-11-16 RX ORDER — METOCLOPRAMIDE 10 MG/1
10 TABLET ORAL ONCE
Status: DISCONTINUED | OUTPATIENT
Start: 2019-11-16 | End: 2019-11-16 | Stop reason: HOSPADM

## 2019-11-16 RX ORDER — HYDROMORPHONE HYDROCHLORIDE 1 MG/ML
0.4 INJECTION, SOLUTION INTRAMUSCULAR; INTRAVENOUS; SUBCUTANEOUS EVERY 5 MIN PRN
Status: DISCONTINUED | OUTPATIENT
Start: 2019-11-16 | End: 2019-11-16 | Stop reason: HOSPADM

## 2019-11-16 RX ORDER — ONDANSETRON 2 MG/ML
4 INJECTION INTRAMUSCULAR; INTRAVENOUS EVERY 6 HOURS PRN
Status: DISCONTINUED | OUTPATIENT
Start: 2019-11-16 | End: 2019-11-16

## 2019-11-16 RX ORDER — HYDROCODONE BITARTRATE AND ACETAMINOPHEN 10; 325 MG/1; MG/1
1 TABLET ORAL EVERY 6 HOURS PRN
Qty: 20 TABLET | Refills: 0 | Status: SHIPPED | OUTPATIENT
Start: 2019-11-16 | End: 2019-11-21

## 2019-11-16 RX ORDER — BUPIVACAINE HYDROCHLORIDE 2.5 MG/ML
INJECTION, SOLUTION EPIDURAL; INFILTRATION; INTRACAUDAL AS NEEDED
Status: DISCONTINUED | OUTPATIENT
Start: 2019-11-16 | End: 2019-11-16 | Stop reason: HOSPADM

## 2019-11-16 RX ORDER — LIDOCAINE HYDROCHLORIDE 10 MG/ML
INJECTION, SOLUTION EPIDURAL; INFILTRATION; INTRACAUDAL; PERINEURAL AS NEEDED
Status: DISCONTINUED | OUTPATIENT
Start: 2019-11-16 | End: 2019-11-16 | Stop reason: SURG

## 2019-11-16 RX ORDER — HYDROMORPHONE HYDROCHLORIDE 1 MG/ML
0.6 INJECTION, SOLUTION INTRAMUSCULAR; INTRAVENOUS; SUBCUTANEOUS EVERY 5 MIN PRN
Status: DISCONTINUED | OUTPATIENT
Start: 2019-11-16 | End: 2019-11-16 | Stop reason: HOSPADM

## 2019-11-16 RX ORDER — MORPHINE SULFATE 2 MG/ML
2 INJECTION, SOLUTION INTRAMUSCULAR; INTRAVENOUS EVERY 10 MIN PRN
Status: DISCONTINUED | OUTPATIENT
Start: 2019-11-16 | End: 2019-11-16 | Stop reason: HOSPADM

## 2019-11-16 RX ORDER — HALOPERIDOL 5 MG/ML
0.25 INJECTION INTRAMUSCULAR ONCE AS NEEDED
Status: DISCONTINUED | OUTPATIENT
Start: 2019-11-16 | End: 2019-11-16 | Stop reason: HOSPADM

## 2019-11-16 RX ORDER — PROCHLORPERAZINE EDISYLATE 5 MG/ML
5 INJECTION INTRAMUSCULAR; INTRAVENOUS ONCE AS NEEDED
Status: DISCONTINUED | OUTPATIENT
Start: 2019-11-16 | End: 2019-11-16 | Stop reason: HOSPADM

## 2019-11-16 RX ORDER — HYDROCODONE BITARTRATE AND ACETAMINOPHEN 5; 325 MG/1; MG/1
1 TABLET ORAL AS NEEDED
Status: DISCONTINUED | OUTPATIENT
Start: 2019-11-16 | End: 2019-11-16 | Stop reason: HOSPADM

## 2019-11-16 RX ORDER — MORPHINE SULFATE 10 MG/ML
6 INJECTION, SOLUTION INTRAMUSCULAR; INTRAVENOUS EVERY 10 MIN PRN
Status: DISCONTINUED | OUTPATIENT
Start: 2019-11-16 | End: 2019-11-16 | Stop reason: HOSPADM

## 2019-11-16 RX ORDER — HYDROCODONE BITARTRATE AND ACETAMINOPHEN 10; 325 MG/1; MG/1
1 TABLET ORAL EVERY 4 HOURS PRN
Status: DISCONTINUED | OUTPATIENT
Start: 2019-11-16 | End: 2019-11-16

## 2019-11-16 RX ORDER — MORPHINE SULFATE 4 MG/ML
4 INJECTION, SOLUTION INTRAMUSCULAR; INTRAVENOUS EVERY 10 MIN PRN
Status: DISCONTINUED | OUTPATIENT
Start: 2019-11-16 | End: 2019-11-16 | Stop reason: HOSPADM

## 2019-11-16 RX ORDER — DEXTROSE MONOHYDRATE 25 G/50ML
50 INJECTION, SOLUTION INTRAVENOUS
Status: DISCONTINUED | OUTPATIENT
Start: 2019-11-16 | End: 2019-11-16 | Stop reason: HOSPADM

## 2019-11-16 RX ORDER — ACETAMINOPHEN 500 MG
1000 TABLET ORAL ONCE
Status: DISCONTINUED | OUTPATIENT
Start: 2019-11-16 | End: 2019-11-16 | Stop reason: HOSPADM

## 2019-11-16 RX ORDER — FAMOTIDINE 20 MG/1
20 TABLET ORAL ONCE
Status: DISCONTINUED | OUTPATIENT
Start: 2019-11-16 | End: 2019-11-16 | Stop reason: HOSPADM

## 2019-11-16 RX ORDER — ONDANSETRON 2 MG/ML
4 INJECTION INTRAMUSCULAR; INTRAVENOUS ONCE AS NEEDED
Status: COMPLETED | OUTPATIENT
Start: 2019-11-16 | End: 2019-11-16

## 2019-11-16 RX ORDER — HEPARIN SODIUM 1000 [USP'U]/ML
INJECTION, SOLUTION INTRAVENOUS; SUBCUTANEOUS AS NEEDED
Status: DISCONTINUED | OUTPATIENT
Start: 2019-11-16 | End: 2019-11-16 | Stop reason: HOSPADM

## 2019-11-16 RX ORDER — CEFAZOLIN SODIUM/WATER 2 G/20 ML
SYRINGE (ML) INTRAVENOUS AS NEEDED
Status: DISCONTINUED | OUTPATIENT
Start: 2019-11-16 | End: 2019-11-16 | Stop reason: SURG

## 2019-11-16 RX ORDER — SODIUM CHLORIDE, SODIUM LACTATE, POTASSIUM CHLORIDE, CALCIUM CHLORIDE 600; 310; 30; 20 MG/100ML; MG/100ML; MG/100ML; MG/100ML
INJECTION, SOLUTION INTRAVENOUS CONTINUOUS
Status: DISCONTINUED | OUTPATIENT
Start: 2019-11-16 | End: 2019-11-16 | Stop reason: HOSPADM

## 2019-11-16 RX ORDER — SODIUM CHLORIDE 9 MG/ML
INJECTION, SOLUTION INTRAVENOUS CONTINUOUS
Status: DISCONTINUED | OUTPATIENT
Start: 2019-11-16 | End: 2019-11-16

## 2019-11-16 RX ORDER — HYDROCODONE BITARTRATE AND ACETAMINOPHEN 5; 325 MG/1; MG/1
2 TABLET ORAL AS NEEDED
Status: DISCONTINUED | OUTPATIENT
Start: 2019-11-16 | End: 2019-11-16 | Stop reason: HOSPADM

## 2019-11-16 RX ORDER — CEFAZOLIN SODIUM/WATER 2 G/20 ML
2 SYRINGE (ML) INTRAVENOUS ONCE
Status: DISCONTINUED | OUTPATIENT
Start: 2019-11-16 | End: 2019-11-16 | Stop reason: HOSPADM

## 2019-11-16 RX ORDER — NALOXONE HYDROCHLORIDE 0.4 MG/ML
80 INJECTION, SOLUTION INTRAMUSCULAR; INTRAVENOUS; SUBCUTANEOUS AS NEEDED
Status: DISCONTINUED | OUTPATIENT
Start: 2019-11-16 | End: 2019-11-16 | Stop reason: HOSPADM

## 2019-11-16 RX ORDER — HYDROMORPHONE HYDROCHLORIDE 1 MG/ML
0.2 INJECTION, SOLUTION INTRAMUSCULAR; INTRAVENOUS; SUBCUTANEOUS EVERY 5 MIN PRN
Status: DISCONTINUED | OUTPATIENT
Start: 2019-11-16 | End: 2019-11-16 | Stop reason: HOSPADM

## 2019-11-16 RX ADMIN — SODIUM CHLORIDE: 9 INJECTION, SOLUTION INTRAVENOUS at 18:38:00

## 2019-11-16 RX ADMIN — LIDOCAINE HYDROCHLORIDE 50 MG: 10 INJECTION, SOLUTION EPIDURAL; INFILTRATION; INTRACAUDAL; PERINEURAL at 15:02:00

## 2019-11-16 RX ADMIN — HEPARIN SODIUM 3000 UNITS: 1000 INJECTION, SOLUTION INTRAVENOUS; SUBCUTANEOUS at 17:13:00

## 2019-11-16 RX ADMIN — ONDANSETRON 4 MG: 2 INJECTION INTRAMUSCULAR; INTRAVENOUS at 18:30:00

## 2019-11-16 RX ADMIN — CEFAZOLIN SODIUM/WATER 2 G: 2 G/20 ML SYRINGE (ML) INTRAVENOUS at 15:10:00

## 2019-11-16 RX ADMIN — SODIUM CHLORIDE: 9 INJECTION, SOLUTION INTRAVENOUS at 14:59:00

## 2019-11-16 NOTE — ANESTHESIA PROCEDURE NOTES
Airway  Urgency: Elective      General Information and Staff    Patient location during procedure: OR    Indications and Patient Condition  Indications for airway management: anesthesia  Spontaneous ventilation: present  Sedation level: deep  Preoxygenated

## 2019-11-16 NOTE — H&P
History & Physical Examination    Patient Name: Tiara Almaguer  MRN: [id number]  [facility]: [id number]  YOB: 1950    Diagnosis: complication of dialysis access device, end stage renal disease    History Present Illness: Patient is a 71year old mg total) by mouth daily. , Disp: 30 tablet, Rfl: 1, 11/16/2019 at 1000      acetaminophen (TYLENOL EXTRA STRENGTH) tab 1,000 mg, 1,000 mg, Oral, Once  metoprolol Tartrate (LOPRESSOR) tab 25 mg, 25 mg, Oral, Once PRN  famoTIDine (PEPCID) tab 20 mg, 20 mg, O operation including bleeding, infection, limb loss, temporary or permanent nerve injury, scarring, disfigurement, need for further operation with the patient, and the patient desires to proceed. I have made no guarantees or promises of outcomes.  The patien

## 2019-11-16 NOTE — ANESTHESIA PREPROCEDURE EVALUATION
Anesthesia PreOp Note    HPI:     Nohemi Drake is a 71year old male who presents for preoperative consultation requested by: Eliazar Oakley MD    Date of Surgery: 11/16/2019    Procedure(s):  A.V. FISTULA  Indication: complication of dialysis access ESOPHAGOGASTRODUODENOSCOPY (EGD) N/A 7/29/2019    Performed by Loraine Grijalva MD at [address] [place] [address] ENDOSCOPY       carvedilol 6.25 MG Oral Tab, Take 6.25 mg by mouth 2 (two) times daily with meals. , Disp: , Rfl: , 11/16/2019 at 1000  glimepiride 2 MG Oral Tab, Take 2 Socioeconomic History      Marital status: Single      Spouse name: Not on file      Number of children: Not on file      Years of education: Not on file      Highest education level: Not on file    Occupational History      Not on file    Social Needs CREATSERUM 2.59 (H) 10/11/2019     (H) 10/11/2019    PGLU 141 (H) 11/16/2019    CA 9.2 10/11/2019     Lab Results   Component Value Date    INR 1.32 (H) 11/16/2019       Vital Signs: Body mass index is 25.5 kg/m².    height is 1.676 m (5' 6\") an

## 2019-11-16 NOTE — BRIEF OP NOTE
[address] UNIT  Brief Op Note       Patients Name: Tiara Almaguer  Attending Physician: Tito Jovel MD  Operating Physician: Jessi Zepeda MD  CSN: [id number]     Location:  OR  MRN: [id number]    YOB: 1950

## 2019-11-17 NOTE — ANESTHESIA POSTPROCEDURE EVALUATION
Patient: Margie Moreno    Procedure Summary     Date:  11/16/19 Room / Location:  [facility] OR 06 / [facility] OR    Anesthesia Start:  8543 Anesthesia Stop:      Procedure:   A.V. FISTULA (Left ) Diagnosis:  (Complication of Dialysis access, End Stage Kiko

## 2019-11-17 NOTE — PLAN OF CARE
Pt arrived from PACU to unit AOx4. VSS. Pt was very thirsty. Water and crackers provided. Pt vomitted a littl bit of water and crackers a couple minutes later.  Pt states that he drank the water too fast. Another trial was done with more water and crackers

## 2019-11-17 NOTE — ANESTHESIA PROCEDURE NOTES
Airway  Urgency: Elective      General Information and Staff    Patient location during procedure: OR  Anesthesiologist: Gisela Morales MD  Performed: anesthesiologist     Indications and Patient Condition  Indications for airway management: anesthesia  Se

## 2019-11-18 NOTE — OPERATIVE REPORT
[facility]    PATIENT'S NAME: Shannon WALLER   ATTENDING PHYSICIAN: Leandro Diaz MD   OPERATING PHYSICIAN: Leandro Diaz MD   PATIENT ACCOUNT#:   [de-identified]    LOCATION:  [address] [place] [address] #:   [id number]       DATE OF PANCHO discussed the options with the patient.   I thought I could take the cephalic vein loop fistula that was still open because it was draining a brachial vein and disconnect it from the venous outflow in the cephalic vein and then use that to anastomose to his converting the forearm loop AV fistula to an upper arm, more of a straight AV fistula, with the brachial artery connected to the basilic vein, brachial vein in the mid upper arm.   Once the cephalic vein was transected in the distal upper arm, I passed leah created a new AV fistula doing this. I repaired the brachial and basilic vein stenosis with vein patch angioplasty, used 6 cm of cephalic vein, and then also did cephalic vein thrombectomy as I used this as my vein patch.   The cephalic vein was thrombosed

## 2019-11-25 ENCOUNTER — HOSPITAL ENCOUNTER (INPATIENT)
Facility: HOSPITAL | Age: 69
LOS: 4 days | Discharge: HOME | DRG: 252 | End: 2019-11-29
Attending: EMERGENCY MEDICINE | Admitting: INTERNAL MEDICINE
Payer: MEDICARE

## 2019-11-25 ENCOUNTER — APPOINTMENT (OUTPATIENT)
Dept: GENERAL RADIOLOGY | Facility: HOSPITAL | Age: 69
DRG: 252 | End: 2019-11-25
Attending: EMERGENCY MEDICINE
Payer: MEDICARE

## 2019-11-25 DIAGNOSIS — I96 DRY GANGRENE (HCC): Primary | ICD-10-CM

## 2019-11-25 DIAGNOSIS — I96 NECROSIS OF TOE (HCC): ICD-10-CM

## 2019-11-25 DIAGNOSIS — I96 GANGRENE (HCC): ICD-10-CM

## 2019-11-25 PROCEDURE — 85652 RBC SED RATE AUTOMATED: CPT | Performed by: EMERGENCY MEDICINE

## 2019-11-25 PROCEDURE — 99285 EMERGENCY DEPT VISIT HI MDM: CPT

## 2019-11-25 PROCEDURE — 73660 X-RAY EXAM OF TOE(S): CPT | Performed by: EMERGENCY MEDICINE

## 2019-11-25 PROCEDURE — 84145 PROCALCITONIN (PCT): CPT | Performed by: EMERGENCY MEDICINE

## 2019-11-25 PROCEDURE — 82962 GLUCOSE BLOOD TEST: CPT

## 2019-11-25 PROCEDURE — 83036 HEMOGLOBIN GLYCOSYLATED A1C: CPT | Performed by: INTERNAL MEDICINE

## 2019-11-25 PROCEDURE — 86140 C-REACTIVE PROTEIN: CPT | Performed by: EMERGENCY MEDICINE

## 2019-11-25 PROCEDURE — 85025 COMPLETE CBC W/AUTO DIFF WBC: CPT | Performed by: EMERGENCY MEDICINE

## 2019-11-25 PROCEDURE — 96365 THER/PROPH/DIAG IV INF INIT: CPT

## 2019-11-25 PROCEDURE — 80048 BASIC METABOLIC PNL TOTAL CA: CPT | Performed by: EMERGENCY MEDICINE

## 2019-11-25 PROCEDURE — 85610 PROTHROMBIN TIME: CPT | Performed by: EMERGENCY MEDICINE

## 2019-11-25 PROCEDURE — 83605 ASSAY OF LACTIC ACID: CPT | Performed by: EMERGENCY MEDICINE

## 2019-11-25 RX ORDER — CARVEDILOL 6.25 MG/1
6.25 TABLET ORAL 2 TIMES DAILY WITH MEALS
Status: DISCONTINUED | OUTPATIENT
Start: 2019-11-25 | End: 2019-11-29

## 2019-11-25 RX ORDER — ISOSORBIDE MONONITRATE 60 MG/1
60 TABLET, EXTENDED RELEASE ORAL DAILY
Status: DISCONTINUED | OUTPATIENT
Start: 2019-11-25 | End: 2019-11-29

## 2019-11-25 RX ORDER — GLIMEPIRIDE 2 MG/1
2 TABLET ORAL
Status: DISCONTINUED | OUTPATIENT
Start: 2019-11-26 | End: 2019-11-27

## 2019-11-25 RX ORDER — AMIODARONE HYDROCHLORIDE 200 MG/1
100 TABLET ORAL NIGHTLY
Status: DISCONTINUED | OUTPATIENT
Start: 2019-11-25 | End: 2019-11-29

## 2019-11-25 RX ORDER — HYDRALAZINE HYDROCHLORIDE 100 MG/1
100 TABLET, FILM COATED ORAL 3 TIMES DAILY
Status: DISCONTINUED | OUTPATIENT
Start: 2019-11-25 | End: 2019-11-29

## 2019-11-25 RX ORDER — SPIRONOLACTONE 25 MG/1
25 TABLET ORAL DAILY
Status: DISCONTINUED | OUTPATIENT
Start: 2019-11-25 | End: 2019-11-29

## 2019-11-25 RX ORDER — DEXTROSE MONOHYDRATE 25 G/50ML
50 INJECTION, SOLUTION INTRAVENOUS AS NEEDED
Status: DISCONTINUED | OUTPATIENT
Start: 2019-11-25 | End: 2019-11-29

## 2019-11-25 RX ORDER — ATORVASTATIN CALCIUM 80 MG/1
80 TABLET, FILM COATED ORAL NIGHTLY
COMMUNITY

## 2019-11-25 RX ORDER — ATORVASTATIN CALCIUM 40 MG/1
80 TABLET, FILM COATED ORAL NIGHTLY
Status: DISCONTINUED | OUTPATIENT
Start: 2019-11-25 | End: 2019-11-29

## 2019-11-25 RX ORDER — FUROSEMIDE 40 MG/1
40 TABLET ORAL DAILY
Status: DISCONTINUED | OUTPATIENT
Start: 2019-11-25 | End: 2019-11-29

## 2019-11-25 NOTE — ED INITIAL ASSESSMENT (HPI)
Pt to the ed with pain to right pinky toe after bumping it on his walker. Right foot pinky toe (fifth) is now bruising swollen and now black. Hx of diabetes. Pt had dialysis today.

## 2019-11-25 NOTE — ED NOTES
Orders for admission, patient is aware of plan and ready to go upstairs. Any questions, please call ED RN quyen at extension [phone]. Pt is alert and oriented, ambulatory with walker, family at bedside.

## 2019-11-25 NOTE — CONSULTS
[facility]    Report of Consultation    Vicky Carlos Patient Status:  Emergency    1950 MRN [id number]   Location [address] [facility] Attending [address]  Rosaline Butt Day # 0 PCP Hilda Mott MD, Miranda Guzman Once  Piperacillin Sod-Tazobactam So (ZOSYN) 3.375 g in dextrose 5 % 100 mL ADD-vantage, 3.375 g, Intravenous, Once      (Not in a hospital admission)      Allergies    Metformin               PAIN    Comment:Terrible upset stomach    Review of Systems: your patient.     Harmeet Money  11/25/2019

## 2019-11-25 NOTE — ED NOTES
Patient bumped his 5th right digit on his walker a few weeks ago. States his toe is now black and is not getting any better.  Unable to visualize cap refill due to color

## 2019-11-25 NOTE — H&P
[facility]    History and Physical    Cathren Post Patient Status:  Inpatient    1950 MRN [id number]   Location [facility] 5SW/SE Attending Brad Phillips MD   Hosp Day # 0 PCP Radha Nieto MD, Dylan Orlando MD     Date:   (two) times daily with meals. glimepiride 2 MG Oral Tab, Take 2 mg by mouth daily with breakfast.  Warfarin Sodium 3 MG Oral Tab, Take 3 mg by mouth daily. spironolactone 25 MG Oral Tab, Take 25 mg by mouth daily.   atorvastatin 80 MG Oral Tab, Take 80 mg (L) 11/25/2019    .0 11/25/2019    CREATSERUM 4.73 (H) 11/25/2019    BUN 31 (H) 11/25/2019     (L) 11/25/2019    K 4.3 11/25/2019    CL 95 (L) 11/25/2019    CO2 33.0 (H) 11/25/2019     (H) 11/25/2019    CA 9.0 11/25/2019    ALB 3.1 (L)

## 2019-11-26 ENCOUNTER — ANESTHESIA EVENT (OUTPATIENT)
Dept: SURGERY | Facility: HOSPITAL | Age: 69
DRG: 252 | End: 2019-11-26
Payer: MEDICARE

## 2019-11-26 ENCOUNTER — APPOINTMENT (OUTPATIENT)
Dept: ULTRASOUND IMAGING | Facility: HOSPITAL | Age: 69
DRG: 252 | End: 2019-11-26
Attending: ORTHOPAEDIC SURGERY
Payer: MEDICARE

## 2019-11-26 ENCOUNTER — ANESTHESIA (OUTPATIENT)
Dept: SURGERY | Facility: HOSPITAL | Age: 69
DRG: 252 | End: 2019-11-26
Payer: MEDICARE

## 2019-11-26 PROCEDURE — 80069 RENAL FUNCTION PANEL: CPT | Performed by: INTERNAL MEDICINE

## 2019-11-26 PROCEDURE — 88305 TISSUE EXAM BY PATHOLOGIST: CPT | Performed by: ORTHOPAEDIC SURGERY

## 2019-11-26 PROCEDURE — 93923 UPR/LXTR ART STDY 3+ LVLS: CPT | Performed by: ORTHOPAEDIC SURGERY

## 2019-11-26 PROCEDURE — 0Y6X0Z1 DETACHMENT AT RIGHT 5TH TOE, HIGH, OPEN APPROACH: ICD-10-PCS | Performed by: ORTHOPAEDIC SURGERY

## 2019-11-26 PROCEDURE — 82962 GLUCOSE BLOOD TEST: CPT

## 2019-11-26 PROCEDURE — 85610 PROTHROMBIN TIME: CPT | Performed by: INTERNAL MEDICINE

## 2019-11-26 PROCEDURE — 85025 COMPLETE CBC W/AUTO DIFF WBC: CPT | Performed by: INTERNAL MEDICINE

## 2019-11-26 PROCEDURE — 88311 DECALCIFY TISSUE: CPT | Performed by: ORTHOPAEDIC SURGERY

## 2019-11-26 RX ORDER — MORPHINE SULFATE 2 MG/ML
1 INJECTION, SOLUTION INTRAMUSCULAR; INTRAVENOUS EVERY 2 HOUR PRN
Status: DISCONTINUED | OUTPATIENT
Start: 2019-11-26 | End: 2019-11-29

## 2019-11-26 RX ORDER — MORPHINE SULFATE 10 MG/ML
6 INJECTION, SOLUTION INTRAMUSCULAR; INTRAVENOUS EVERY 10 MIN PRN
Status: DISCONTINUED | OUTPATIENT
Start: 2019-11-26 | End: 2019-11-26 | Stop reason: HOSPADM

## 2019-11-26 RX ORDER — HEPARIN SODIUM 5000 [USP'U]/ML
5000 INJECTION, SOLUTION INTRAVENOUS; SUBCUTANEOUS EVERY 12 HOURS SCHEDULED
Status: DISCONTINUED | OUTPATIENT
Start: 2019-11-26 | End: 2019-11-29

## 2019-11-26 RX ORDER — HYDROMORPHONE HYDROCHLORIDE 1 MG/ML
0.4 INJECTION, SOLUTION INTRAMUSCULAR; INTRAVENOUS; SUBCUTANEOUS EVERY 5 MIN PRN
Status: DISCONTINUED | OUTPATIENT
Start: 2019-11-26 | End: 2019-11-26 | Stop reason: HOSPADM

## 2019-11-26 RX ORDER — MORPHINE SULFATE 4 MG/ML
4 INJECTION, SOLUTION INTRAMUSCULAR; INTRAVENOUS EVERY 2 HOUR PRN
Status: DISCONTINUED | OUTPATIENT
Start: 2019-11-26 | End: 2019-11-29

## 2019-11-26 RX ORDER — HALOPERIDOL 5 MG/ML
0.25 INJECTION INTRAMUSCULAR ONCE AS NEEDED
Status: DISCONTINUED | OUTPATIENT
Start: 2019-11-26 | End: 2019-11-26 | Stop reason: HOSPADM

## 2019-11-26 RX ORDER — MORPHINE SULFATE 4 MG/ML
4 INJECTION, SOLUTION INTRAMUSCULAR; INTRAVENOUS EVERY 10 MIN PRN
Status: DISCONTINUED | OUTPATIENT
Start: 2019-11-26 | End: 2019-11-26 | Stop reason: HOSPADM

## 2019-11-26 RX ORDER — HYDROMORPHONE HYDROCHLORIDE 1 MG/ML
0.6 INJECTION, SOLUTION INTRAMUSCULAR; INTRAVENOUS; SUBCUTANEOUS EVERY 5 MIN PRN
Status: DISCONTINUED | OUTPATIENT
Start: 2019-11-26 | End: 2019-11-26 | Stop reason: HOSPADM

## 2019-11-26 RX ORDER — ONDANSETRON 2 MG/ML
4 INJECTION INTRAMUSCULAR; INTRAVENOUS ONCE AS NEEDED
Status: DISCONTINUED | OUTPATIENT
Start: 2019-11-26 | End: 2019-11-26 | Stop reason: HOSPADM

## 2019-11-26 RX ORDER — MORPHINE SULFATE 2 MG/ML
2 INJECTION, SOLUTION INTRAMUSCULAR; INTRAVENOUS EVERY 2 HOUR PRN
Status: DISCONTINUED | OUTPATIENT
Start: 2019-11-26 | End: 2019-11-29

## 2019-11-26 RX ORDER — DEXTROSE MONOHYDRATE 25 G/50ML
50 INJECTION, SOLUTION INTRAVENOUS
Status: DISCONTINUED | OUTPATIENT
Start: 2019-11-26 | End: 2019-11-26 | Stop reason: HOSPADM

## 2019-11-26 RX ORDER — SODIUM CHLORIDE 9 MG/ML
INJECTION, SOLUTION INTRAVENOUS CONTINUOUS PRN
Status: DISCONTINUED | OUTPATIENT
Start: 2019-11-26 | End: 2019-11-26 | Stop reason: SURG

## 2019-11-26 RX ORDER — ONDANSETRON 2 MG/ML
4 INJECTION INTRAMUSCULAR; INTRAVENOUS EVERY 6 HOURS PRN
Status: DISCONTINUED | OUTPATIENT
Start: 2019-11-26 | End: 2019-11-29

## 2019-11-26 RX ORDER — HYDROCODONE BITARTRATE AND ACETAMINOPHEN 5; 325 MG/1; MG/1
1 TABLET ORAL AS NEEDED
Status: DISCONTINUED | OUTPATIENT
Start: 2019-11-26 | End: 2019-11-26 | Stop reason: HOSPADM

## 2019-11-26 RX ORDER — SODIUM CHLORIDE, SODIUM LACTATE, POTASSIUM CHLORIDE, CALCIUM CHLORIDE 600; 310; 30; 20 MG/100ML; MG/100ML; MG/100ML; MG/100ML
INJECTION, SOLUTION INTRAVENOUS CONTINUOUS
Status: DISCONTINUED | OUTPATIENT
Start: 2019-11-26 | End: 2019-11-26 | Stop reason: HOSPADM

## 2019-11-26 RX ORDER — METOPROLOL TARTRATE 5 MG/5ML
2.5 INJECTION INTRAVENOUS ONCE
Status: DISCONTINUED | OUTPATIENT
Start: 2019-11-26 | End: 2019-11-26 | Stop reason: HOSPADM

## 2019-11-26 RX ORDER — LIDOCAINE HYDROCHLORIDE 20 MG/ML
INJECTION, SOLUTION EPIDURAL; INFILTRATION; INTRACAUDAL; PERINEURAL AS NEEDED
Status: DISCONTINUED | OUTPATIENT
Start: 2019-11-26 | End: 2019-11-26 | Stop reason: HOSPADM

## 2019-11-26 RX ORDER — HYDROCODONE BITARTRATE AND ACETAMINOPHEN 5; 325 MG/1; MG/1
1 TABLET ORAL EVERY 4 HOURS PRN
Status: DISCONTINUED | OUTPATIENT
Start: 2019-11-26 | End: 2019-11-29

## 2019-11-26 RX ORDER — PROCHLORPERAZINE EDISYLATE 5 MG/ML
5 INJECTION INTRAMUSCULAR; INTRAVENOUS ONCE AS NEEDED
Status: DISCONTINUED | OUTPATIENT
Start: 2019-11-26 | End: 2019-11-26 | Stop reason: HOSPADM

## 2019-11-26 RX ORDER — HYDROCODONE BITARTRATE AND ACETAMINOPHEN 5; 325 MG/1; MG/1
2 TABLET ORAL AS NEEDED
Status: DISCONTINUED | OUTPATIENT
Start: 2019-11-26 | End: 2019-11-26 | Stop reason: HOSPADM

## 2019-11-26 RX ORDER — NALOXONE HYDROCHLORIDE 0.4 MG/ML
80 INJECTION, SOLUTION INTRAMUSCULAR; INTRAVENOUS; SUBCUTANEOUS AS NEEDED
Status: DISCONTINUED | OUTPATIENT
Start: 2019-11-26 | End: 2019-11-26 | Stop reason: HOSPADM

## 2019-11-26 RX ORDER — HYDROMORPHONE HYDROCHLORIDE 1 MG/ML
0.2 INJECTION, SOLUTION INTRAMUSCULAR; INTRAVENOUS; SUBCUTANEOUS EVERY 5 MIN PRN
Status: DISCONTINUED | OUTPATIENT
Start: 2019-11-26 | End: 2019-11-26 | Stop reason: HOSPADM

## 2019-11-26 RX ORDER — MORPHINE SULFATE 2 MG/ML
2 INJECTION, SOLUTION INTRAMUSCULAR; INTRAVENOUS EVERY 10 MIN PRN
Status: DISCONTINUED | OUTPATIENT
Start: 2019-11-26 | End: 2019-11-26 | Stop reason: HOSPADM

## 2019-11-26 RX ORDER — SODIUM CHLORIDE 0.9 % (FLUSH) 0.9 %
10 SYRINGE (ML) INJECTION AS NEEDED
Status: DISCONTINUED | OUTPATIENT
Start: 2019-11-26 | End: 2019-11-29

## 2019-11-26 RX ADMIN — SODIUM CHLORIDE: 9 INJECTION, SOLUTION INTRAVENOUS at 18:37:00

## 2019-11-26 RX ADMIN — SODIUM CHLORIDE: 9 INJECTION, SOLUTION INTRAVENOUS at 19:32:00

## 2019-11-26 NOTE — PLAN OF CARE
Pt is A/O x 4. Vitals WDL. Room air. Pt complains of pain when foot moved. ACHS. Doppler used to assess bilateral pedal pulses.    Problem: Patient Centered Care  Goal: Patient preferences are identified and integrated in the patient's plan of care  Josie Fairbanks pain   - See additional Care Plan goals for specific interventions   11/26/2019 0016 by Gil Ramon  Outcome: Progressing  11/26/2019 0013 by Gil Ramon  Outcome: Progressing

## 2019-11-26 NOTE — PLAN OF CARE
Patient is scheduled for amputation of the right 5th toe today. Informed consent and disposition signed. Experienced hypoglycemia at 0827. Administered D50, and BS went from 44 to 183. Dialysis scheduled for tomorrow.     Problem: Patient Centered Care pain   - See additional Care Plan goals for specific interventions   11/26/2019 1720 by Diana Hu RN  Outcome: Progressing

## 2019-11-26 NOTE — CONSULTS
Pt seen and examined. Full consult dictated. 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 Recommend arterial blood flow studies. Will schedule for surgical debridement once resection level determined. Anticipate surgery later today or tomorrow.   NPO after

## 2019-11-26 NOTE — PROGRESS NOTES
[facility]    Progress Note    Christi Russell Patient Status:  Inpatient    1950 MRN [id number]   Location [facility] 5SW/SE Attending Shirley Napoles MD   Hosp Day # 1 PCP Darryn Arias MD, Wong Cruz MD        Subjective: 10/11/2019    PTT 40.2 (H) 07/25/2019    INR 2.00 (H) 11/26/2019    TSH 1.530 07/25/2019    ESRML 88 (H) 11/25/2019    CRP 3.76 (H) 11/25/2019    PHOS 5.3 (H) 11/26/2019    B12 551 07/30/2019       Xr Toe(s) (min 2 Views), Right 5th (cpt=73660)    Result D

## 2019-11-26 NOTE — CONSULTS
[facility]    PATIENT'S NAME: Purviia Too WALLER   ATTENDING PHYSICIAN: Kalani Cervantes MD   CONSULTING PHYSICIAN: Damien Perez MD   PATIENT ACCOUNT#:   [id number]    LOCATION:  [address] [place] [address]. [facility] #:   [id number]       DATE OF B There is some subungual gas within the tuft of the fifth digit. ASSESSMENT AND PLAN:  The patient has right fifth toe gangrene related to diabetes and peripheral vascular disease.   I recommend an arterial blood flow study to the right lower extremity to

## 2019-11-27 ENCOUNTER — APPOINTMENT (OUTPATIENT)
Dept: INTERVENTIONAL RADIOLOGY/VASCULAR | Facility: HOSPITAL | Age: 69
DRG: 252 | End: 2019-11-27
Attending: RADIOLOGY
Payer: MEDICARE

## 2019-11-27 PROCEDURE — 37232 HC TRANSL ANGIOPLASTY TIBIAL PERONEAL UNIL EA ADDL: CPT

## 2019-11-27 PROCEDURE — 90935 HEMODIALYSIS ONE EVALUATION: CPT

## 2019-11-27 PROCEDURE — B4101ZZ FLUOROSCOPY OF ABDOMINAL AORTA USING LOW OSMOLAR CONTRAST: ICD-10-PCS | Performed by: RADIOLOGY

## 2019-11-27 PROCEDURE — 82962 GLUCOSE BLOOD TEST: CPT

## 2019-11-27 PROCEDURE — 047R3ZZ DILATION OF RIGHT POSTERIOR TIBIAL ARTERY, PERCUTANEOUS APPROACH: ICD-10-PCS | Performed by: RADIOLOGY

## 2019-11-27 PROCEDURE — 5A1D70Z PERFORMANCE OF URINARY FILTRATION, INTERMITTENT, LESS THAN 6 HOURS PER DAY: ICD-10-PCS | Performed by: INTERNAL MEDICINE

## 2019-11-27 PROCEDURE — B41F1ZZ FLUOROSCOPY OF RIGHT LOWER EXTREMITY ARTERIES USING LOW OSMOLAR CONTRAST: ICD-10-PCS | Performed by: RADIOLOGY

## 2019-11-27 PROCEDURE — 85347 COAGULATION TIME ACTIVATED: CPT

## 2019-11-27 PROCEDURE — 37228 HC TRANSLUMINAL ANGIO TIBIAL PERONEAL UNILAT INIT: CPT

## 2019-11-27 PROCEDURE — 85025 COMPLETE CBC W/AUTO DIFF WBC: CPT | Performed by: ORTHOPAEDIC SURGERY

## 2019-11-27 PROCEDURE — B41G1ZZ FLUOROSCOPY OF LEFT LOWER EXTREMITY ARTERIES USING LOW OSMOLAR CONTRAST: ICD-10-PCS | Performed by: RADIOLOGY

## 2019-11-27 PROCEDURE — 75716 ARTERY X-RAYS ARMS/LEGS: CPT

## 2019-11-27 PROCEDURE — 99153 MOD SED SAME PHYS/QHP EA: CPT

## 2019-11-27 PROCEDURE — 99152 MOD SED SAME PHYS/QHP 5/>YRS: CPT

## 2019-11-27 PROCEDURE — 87340 HEPATITIS B SURFACE AG IA: CPT | Performed by: INTERNAL MEDICINE

## 2019-11-27 RX ORDER — VERAPAMIL HYDROCHLORIDE 2.5 MG/ML
INJECTION, SOLUTION INTRAVENOUS
Status: COMPLETED
Start: 2019-11-27 | End: 2019-11-27

## 2019-11-27 RX ORDER — ALBUMIN (HUMAN) 12.5 G/50ML
100 SOLUTION INTRAVENOUS
Status: DISCONTINUED | OUTPATIENT
Start: 2019-11-29 | End: 2019-11-29

## 2019-11-27 RX ORDER — MIDAZOLAM HYDROCHLORIDE 1 MG/ML
INJECTION INTRAMUSCULAR; INTRAVENOUS
Status: COMPLETED
Start: 2019-11-27 | End: 2019-11-27

## 2019-11-27 RX ORDER — CLOPIDOGREL BISULFATE 75 MG/1
TABLET ORAL
Status: COMPLETED
Start: 2019-11-27 | End: 2019-11-27

## 2019-11-27 RX ORDER — HEPARIN SODIUM 1000 [USP'U]/ML
3200 INJECTION, SOLUTION INTRAVENOUS; SUBCUTANEOUS
Status: DISCONTINUED | OUTPATIENT
Start: 2019-11-27 | End: 2019-11-29

## 2019-11-27 RX ORDER — LIDOCAINE HYDROCHLORIDE 20 MG/ML
INJECTION, SOLUTION EPIDURAL; INFILTRATION; INTRACAUDAL; PERINEURAL
Status: COMPLETED
Start: 2019-11-27 | End: 2019-11-27

## 2019-11-27 RX ORDER — NITROGLYCERIN 20 MG/100ML
INJECTION INTRAVENOUS
Status: COMPLETED
Start: 2019-11-27 | End: 2019-11-27

## 2019-11-27 RX ORDER — HEPARIN SODIUM 1000 [USP'U]/ML
INJECTION, SOLUTION INTRAVENOUS; SUBCUTANEOUS
Status: COMPLETED
Start: 2019-11-27 | End: 2019-11-27

## 2019-11-27 NOTE — ANESTHESIA POSTPROCEDURE EVALUATION
Patient: Jing Stewart    Procedure Summary     Date:  11/26/19 Room / Location:  [facility] OR 04 / [facility] OR    Anesthesia Start:  3762 Anesthesia Stop:  1933    Procedure:  TOE AMPUTATION (Right ) Diagnosis:       Gangrene ([facility] [address]      (Gangrene ([facility] [address]

## 2019-11-27 NOTE — OPERATIVE REPORT
[facility]    PATIENT'S NAME: Jaya WALLER   ATTENDING PHYSICIAN: Enedina Ramires MD   OPERATING PHYSICIAN: Damien Morgan MD   PATIENT ACCOUNT#:   [id number]    LOCATION:  [address] [place] [address]. [place] [address] #:   [id number]       DATE OF BI phalanx was identified and sharply excised. A full-thickness soft tissue envelope was left in situ. We removed the toe at the base of the proximal phalanx and sent the specimen to Pathology for routine analysis.   The area was copiously irrigated and clos

## 2019-11-27 NOTE — PROGRESS NOTES
[facility]    Progress Note    Jing Stewart Patient Status:  Inpatient    1950 MRN [id number]   Location [facility] 5SW/SE Attending Myla Mcgee MD   Hosp Day # 2 PCP Jordy Reyes MD, Eugene Kent MD        Subjective: 10/11/2019    TP 7.8 10/11/2019    AST 34 10/11/2019    ALT 33 10/11/2019    PTT 40.2 (H) 07/25/2019    INR 2.00 (H) 11/26/2019    TSH 1.530 07/25/2019    ESRML 88 (H) 11/25/2019    CRP 3.76 (H) 11/25/2019    PHOS 5.3 (H) 11/26/2019    B12 551 07/30/2019

## 2019-11-27 NOTE — ANESTHESIA PREPROCEDURE EVALUATION
Anesthesia PreOp Note    HPI:     Orestes Sosa is a 71year old male who presents for preoperative consultation requested by: Luigi Jean MD    Date of Surgery: 11/25/2019 - 11/26/2019    Procedure(s):  TOE AMPUTATION  Indication: Gangrene ([address] Jennifer Lake MD at [address] [place] [address] MAIN OR   • A.V. FISTULA Left 7/26/2019    Performed by Jennifer Lake MD at [address]   • CABG  09/2015   • COLONOSCOPY N/A 7/29/2019    Performed by Chaim Frost MD at [address] [place] [address] ENDOSCOPY   • ESOPHAGOGASTRODUODENOSCOPY (EGD) N/A 7/ Glucose-Vitamin C (DEX-4) chewable tab 4 tablet, 4 tablet, Oral, Q15 Min PRN, Rajan Garrido MD  [facility] Hold] glucose (DEX4) oral liquid 15 g, 15 g, Oral, Q15 Min PRN, Rajan Garrido MD  [facility] Hold] hydrALAZINE HCl (APRESOLINE) tab 100 mg, 100 mg, [place] organization: Not on file        Attends meetings of clubs or organizations: Not on file        Relationship status: Not on file      Intimate partner violence:        Fear of current or ex partner: Not on file        Emotionally abused: Not on file negative ROS     GI/Hepatic/Renal - negative ROS     Endo/Other - negative ROS   Abdominal                Anesthesia Plan:   ASA:  3  Plan:   General  Airway:  LMA  Post-op Pain Management: IV analgesics      I have informed Sam Lazaro and/or legal

## 2019-11-27 NOTE — CONSULTS
[facility]  Vascular Interventional Radiology Consultation    Tiara Almaguer Patient Status:  Inpatient    1950 MRN [id number]   Location [facility] 5SW/SE Attending Emelia Watson MD   Hosp Day # 2 PCP Aniya Barrios MD, Vaibhav Marcum, PRN  •  Heparin Sodium (Porcine) 5000 UNIT/ML injection 5,000 Units, 5,000 Units, Subcutaneous, 2 times per day  •  morphINE sulfate (PF) 2 MG/ML injection 1 mg, 1 mg, Intravenous, Q2H PRN **OR** morphINE sulfate (PF) 2 MG/ML injection 2 mg, 2 mg, Intraven orientated X 3, well developed, well nourished, in no apparent distress  HEENT: ears and throat are clear  NECK: supple, no lymphadenopathy, thyroid wnl  CAROTID: no bruits  RESPIRATORY: no rales  CARDIO: RRR   ABDOMEN: soft, non-tender with no palpable an

## 2019-11-27 NOTE — OPERATIVE REPORT
Operative Note    Patient Name: Celina Emery    Preoperative Diagnosis: Gangrene ([facility] [address] [address] Ezequiel Frost    Postoperative Diagnosis: Gangrene ([facility] [address] Ezequiel Frost    Primary Surgeon: Dana Hearn MD     Assistant: Gio Vigil, [facility]    Procedures: Right 5th toe amputati

## 2019-11-27 NOTE — PROGRESS NOTES
[facility]    Progress Note    Cathren Post Patient Status:  Inpatient    1950 MRN [id number]   Location [facility] 5SW/SE Attending Brad Phillips MD   Hosp Day # 2 PCP Radha Nieto MD, Dylan Orlando MD       Subjective:   Sam Arora the 5th digit, likely posttraumatic. No acute fracture, dislocation or osseous erosions.     Dictated by (CST): Yajaira Bland MD on 11/25/2019 at 12:49     Approved by (CST): Yajaira Bland MD on 11/25/2019 at 12:52           Joel Mims ([phone]=[phone])

## 2019-11-27 NOTE — PROGRESS NOTES
Procedure hand off report given to Shabnam Charles RN. Procedural access site is dry and intact with no signs and symptoms of bleeding and hematoma. Pt is generally stable.

## 2019-11-27 NOTE — PLAN OF CARE
Problem: Patient Centered Care  Goal: Patient preferences are identified and integrated in the patient's plan of care  Description  Interventions:  - What would you like us to know as we care for you?  NICHOLAS FountainO, amputation of the right 5th toe, dial

## 2019-11-27 NOTE — PLAN OF CARE
Pt is A/O x 4. Vitals WDL. Sinus Lurlene Baas patients baseline. Continuous pulse ox. Doppler used to assess pulses. Amputation of right 5th toe received from PACU this shift. ACHS. Per patient \"I pee but I don't go a lot. \" 1 norco given for pain.  Will continue additional Care Plan goals for specific interventions   Outcome: Progressing

## 2019-11-27 NOTE — BRIEF PROCEDURE NOTE
[facility] HOSP - [facility]  Procedure Note    Emmanuel Alaniz Patient Status:  Inpatient    1950 MRN [id number]   Location [facility] Attending Manuel Tilley MD   Hosp Day # 2 PCP Tsering Joshua MD, Iza Alvarado MD

## 2019-11-28 PROCEDURE — 97165 OT EVAL LOW COMPLEX 30 MIN: CPT

## 2019-11-28 PROCEDURE — 97161 PT EVAL LOW COMPLEX 20 MIN: CPT

## 2019-11-28 PROCEDURE — 80048 BASIC METABOLIC PNL TOTAL CA: CPT | Performed by: INTERNAL MEDICINE

## 2019-11-28 PROCEDURE — 97530 THERAPEUTIC ACTIVITIES: CPT

## 2019-11-28 PROCEDURE — 85027 COMPLETE CBC AUTOMATED: CPT | Performed by: INTERNAL MEDICINE

## 2019-11-28 PROCEDURE — 85610 PROTHROMBIN TIME: CPT | Performed by: INTERNAL MEDICINE

## 2019-11-28 PROCEDURE — 82962 GLUCOSE BLOOD TEST: CPT

## 2019-11-28 NOTE — DISCHARGE SUMMARY
[facility]    Discharge Summary    Nohemi Drake Patient Status:  Inpatient    1950 MRN [id number]   Location [facility] 3W/SW Attending Kunal Tejada MD   Hosp Day # 3 PCP Kassi Maza MD, Deandre Little MD     Date of Admiss daily.    amiodarone HCl 100 MG Oral Tab  Take 1 tablet (100 mg total) by mouth nightly. furosemide 40 MG Oral Tab  Take 1 tablet (40 mg total) by mouth daily.     hydrALAZINE HCl 100 MG Oral Tab  Take 1 tablet (100 mg total) by mouth 3 (three) times doretha

## 2019-11-28 NOTE — OCCUPATIONAL THERAPY NOTE
OCCUPATIONAL THERAPY EVALUATION - INPATIENT     Room Number: 835/464-M  Evaluation Date: 11/28/2019  Type of Evaluation: Initial  Presenting Problem: R 5th toe amputation    Physician Order: IP Consult to Occupational Therapy  Reason for Therapy: ADL/IADL disorder    • Stroke [facility]) 2015   • Visual impairment     READERS       Past Surgical History  Past Surgical History:   Procedure Laterality Date   • A.V. FISTULA Left 11/16/2019    Performed by Gurmeet Zambrano MD at [facility] OR   • A.V. FISTULA Left 7/26/2 urinal? : None  -   Putting on and taking off regular upper body clothing?: None  -   Taking care of personal grooming such as brushing teeth?: None  -   Eating meals?: None    AM-PAC Score:  Score: 22  Approx Degree of Impairment: 25.8%  Standardized Scor

## 2019-11-28 NOTE — PLAN OF CARE
Problem: Patient Centered Care  Goal: Patient preferences are identified and integrated in the patient's plan of care  Description  Interventions:  - What would you like us to know as we care for you?  NCIHOLAS FountainO, amputation of the right 5th toe, dial removed. HR yaa in the 50s asymptomatic, per pt this is normal for him. Cath groin site soft no hematoma, no complaints of pain, will continue to monitor.

## 2019-11-28 NOTE — PLAN OF CARE
Problem: Patient Centered Care  Goal: Patient preferences are identified and integrated in the patient's plan of care  Description  Interventions:   - What would you like us to know as we care for you?  I live at home with my life partner  - Provide timel

## 2019-11-28 NOTE — PHYSICAL THERAPY NOTE
PHYSICAL THERAPY EVALUATION - INPATIENT     Room Number: 099/354-M  Evaluation Date: 11/28/2019  Type of Evaluation: Initial   Physician Order: PT Eval and Treat    Presenting Problem: R 5th toe amputation  Reason for Therapy: Mobility Dysfunction and Dis education; Family education;Gait training;Neuromuscular re-educate;Range of motion;Strengthening;Stoop training;Transfer training;Balance training  Rehab Potential : Fair  Frequency (Obs): 3-5x/week       PHYSICAL THERAPY MEDICAL/SOCIAL HISTORY     History not quantify)  Location: R lateral distal foot  Management Techniques: Activity promotion; Body mechanics; Relaxation;Repositioning    COGNITION  · Overall Cognitive Status:  WFL - within functional limits  · Safety Judgement:  decreased awareness of need fo rollator walker CGA    Exercise/Education Provided:  Bed mobility  Body mechanics  Energy conservation  Functional activity tolerated  Gait training  Posture  Transfer training    Patient End of Session: Up in chair;Needs met;Call light within reach;RN coreen

## 2019-11-28 NOTE — PROGRESS NOTES
Pt was received from cath lab with Fem Stop still in place. There was no pressure on. This RN was told that the Fem Stop was off with a pressure dressing in place. Fem Pop removed with help of TL. Site is soft, no hematoma noted.  Pulses are heard via doppl

## 2019-11-28 NOTE — PROGRESS NOTES
Subjective: No complaints. Pain controlled.     Objective:    Patient Vitals for the past 24 hrs:   BP Temp Temp src Pulse Resp SpO2 Weight   11/28/19 0917 135/59 99.1 °F (37.3 °C) Oral 73 18 94 % —   11/28/19 0503 123/33 98.9 °F (37.2 °C) Oral 66 19 96 % 1

## 2019-11-29 VITALS
HEART RATE: 62 BPM | WEIGHT: 154.88 LBS | TEMPERATURE: 99 F | RESPIRATION RATE: 18 BRPM | HEIGHT: 66 IN | BODY MASS INDEX: 24.89 KG/M2 | SYSTOLIC BLOOD PRESSURE: 146 MMHG | OXYGEN SATURATION: 99 % | DIASTOLIC BLOOD PRESSURE: 52 MMHG

## 2019-11-29 PROCEDURE — 82962 GLUCOSE BLOOD TEST: CPT

## 2019-11-29 PROCEDURE — P9047 ALBUMIN (HUMAN), 25%, 50ML: HCPCS | Performed by: INTERNAL MEDICINE

## 2019-11-29 PROCEDURE — 90935 HEMODIALYSIS ONE EVALUATION: CPT

## 2019-11-29 RX ORDER — HYDROCODONE BITARTRATE AND ACETAMINOPHEN 10; 325 MG/1; MG/1
1 TABLET ORAL EVERY 4 HOURS PRN
Qty: 25 TABLET | Refills: 0 | Status: SHIPPED | OUTPATIENT
Start: 2019-11-29 | End: 2020-06-25

## 2019-11-29 RX ORDER — HYDROCODONE BITARTRATE AND ACETAMINOPHEN 10; 325 MG/1; MG/1
1 TABLET ORAL EVERY 4 HOURS PRN
Status: DISCONTINUED | OUTPATIENT
Start: 2019-11-29 | End: 2019-11-29

## 2019-11-29 RX ORDER — HYDROCODONE BITARTRATE AND ACETAMINOPHEN 5; 325 MG/1; MG/1
1 TABLET ORAL EVERY 4 HOURS PRN
Qty: 30 TABLET | Refills: 0 | Status: SHIPPED | OUTPATIENT
Start: 2019-11-29 | End: 2019-11-29

## 2019-11-29 NOTE — PLAN OF CARE
No complaints of pain. Dressing c/d/I. Dialysis done today. VSS. Discharge eduction and instructions given. Plan to discharge home.      Problem: Patient Centered Care  Goal: Patient preferences are identified and integrated in the patient's plan of care  D

## 2019-11-29 NOTE — PROGRESS NOTES
Subjective: Pain controlled. Reports bloody drainage from the incision yesterday after ambulating with therapy. Denies chest pain, shortness of breath, calf pain, calf swelling, lightheadedness, and dizziness.     Objective:      Patient Vitals for the past Currently taking Norco 5 mg/325 mg tablets PRN pain  Continue DVT prophylaxis: Currently on Heparin. Continue PT/OT: WBAT on the RLE in the right foot post-op shoe. Continue to keep right foot incision clean and dry until follow-up appointment.  Redressed

## 2019-11-29 NOTE — PROGRESS NOTES
[facility]    Progress Note    Jing Stewart Patient Status:  Inpatient    1950 MRN [id number]   Location [facility] 3W/SW Attending Myla Mcgee MD   Hosp Day # 4 PCP Jordy Reyes MD, Eugene Kent MD        Subjective: 10/11/2019    ALT 33 10/11/2019    PTT 40.2 (H) 07/25/2019    INR 2.14 (H) 11/28/2019    TSH 1.530 07/25/2019    ESRML 88 (H) 11/25/2019    CRP 3.76 (H) 11/25/2019    PHOS 5.3 (H) 11/26/2019    B12 551 07/30/2019                        Darryn Arias MD, Wong Cruz

## 2019-11-29 NOTE — PROGRESS NOTES
[facility]    Progress Note    Isha Fernandez Patient Status:  Inpatient    1950 MRN [id number]   Location [facility] 3W/SW Attending Ana Lilia Fontenot MD   Hosp Day # 4 PCP Jv Waite MD, Ailin Rowland MD       Subjective:    Wi

## 2019-12-06 ENCOUNTER — HOSPITAL ENCOUNTER (EMERGENCY)
Facility: HOSPITAL | Age: 69
Discharge: HOME | End: 2019-12-06
Attending: EMERGENCY MEDICINE
Payer: MEDICARE

## 2019-12-06 ENCOUNTER — APPOINTMENT (OUTPATIENT)
Dept: GENERAL RADIOLOGY | Facility: HOSPITAL | Age: 69
End: 2019-12-06
Attending: EMERGENCY MEDICINE
Payer: MEDICARE

## 2019-12-06 ENCOUNTER — APPOINTMENT (OUTPATIENT)
Dept: ULTRASOUND IMAGING | Facility: HOSPITAL | Age: 69
End: 2019-12-06
Attending: EMERGENCY MEDICINE
Payer: MEDICARE

## 2019-12-06 VITALS
SYSTOLIC BLOOD PRESSURE: 146 MMHG | OXYGEN SATURATION: 97 % | RESPIRATION RATE: 16 BRPM | WEIGHT: 154 LBS | HEART RATE: 60 BPM | TEMPERATURE: 98 F | HEIGHT: 66 IN | DIASTOLIC BLOOD PRESSURE: 50 MMHG | BODY MASS INDEX: 24.75 KG/M2

## 2019-12-06 DIAGNOSIS — G89.18 POST-OP PAIN: ICD-10-CM

## 2019-12-06 DIAGNOSIS — M79.671 FOOT PAIN, RIGHT: Primary | ICD-10-CM

## 2019-12-06 DIAGNOSIS — I82.431 ACUTE DEEP VEIN THROMBOSIS (DVT) OF POPLITEAL VEIN OF RIGHT LOWER EXTREMITY (HCC): ICD-10-CM

## 2019-12-06 PROCEDURE — 80048 BASIC METABOLIC PNL TOTAL CA: CPT | Performed by: EMERGENCY MEDICINE

## 2019-12-06 PROCEDURE — 93971 EXTREMITY STUDY: CPT | Performed by: EMERGENCY MEDICINE

## 2019-12-06 PROCEDURE — 99284 EMERGENCY DEPT VISIT MOD MDM: CPT

## 2019-12-06 PROCEDURE — 85025 COMPLETE CBC W/AUTO DIFF WBC: CPT | Performed by: EMERGENCY MEDICINE

## 2019-12-06 PROCEDURE — 96374 THER/PROPH/DIAG INJ IV PUSH: CPT

## 2019-12-06 PROCEDURE — 85610 PROTHROMBIN TIME: CPT | Performed by: EMERGENCY MEDICINE

## 2019-12-06 PROCEDURE — 73630 X-RAY EXAM OF FOOT: CPT | Performed by: EMERGENCY MEDICINE

## 2019-12-06 RX ORDER — MORPHINE SULFATE 4 MG/ML
2 INJECTION, SOLUTION INTRAMUSCULAR; INTRAVENOUS ONCE
Status: COMPLETED | OUTPATIENT
Start: 2019-12-06 | End: 2019-12-06

## 2019-12-06 RX ORDER — HYDROCODONE BITARTRATE AND ACETAMINOPHEN 10; 325 MG/1; MG/1
1 TABLET ORAL EVERY 8 HOURS PRN
Qty: 12 TABLET | Refills: 0 | Status: SHIPPED | OUTPATIENT
Start: 2019-12-06 | End: 2019-12-13

## 2019-12-06 RX ORDER — CEPHALEXIN 500 MG/1
500 CAPSULE ORAL 2 TIMES DAILY
Qty: 14 CAPSULE | Refills: 0 | Status: SHIPPED | OUTPATIENT
Start: 2019-12-06 | End: 2019-12-13

## 2019-12-06 NOTE — ED NOTES
Pt presents with c/o 10/10 R foot pain d/t 5th digit amputation x1.5 weeks ago from an injury/infx. Denies n/v/d/f/c. Some redness and dried blood noted. Completed antibiotic. +blood thinner. Dialysis MWF - completed today.   PMH ESRD, DM, HTN, HL, triple b

## 2019-12-06 NOTE — ED INITIAL ASSESSMENT (HPI)
Pt had his 5th digit right foot amputated last week and now comes in c/o increased pain and swelling. Denies CP, SOB.

## 2019-12-08 NOTE — ED PROVIDER NOTES
Patient Seen in: [facility] AND [facility] Emergency Department    History   Patient presents with:  Swelling    Stated Complaint: pain     HPI    Patient complains of pain in r foot at site of his recent 5th toe amputation. Pain goes into r lower leg.   Had inc with meals. Warfarin Sodium 3 MG Oral Tab,  Take 3 mg by mouth daily. spironolactone 25 MG Oral Tab,  Take 25 mg by mouth daily. aspirin 81 MG Oral Tab,  Take 81 mg by mouth daily.    amiodarone HCl 100 MG Oral Tab,  Take 1 tablet (100 mg total) by mo noted  SKIN: see above  PSYCH: calm, cooperative,    Differential includes:pvd vs. Wound infection vs. Osteomyelitis vs. Post op pain    ED Course     Labs Reviewed   BASIC METABOLIC PANEL (8) - Abnormal; Notable for the following components:       Result ulceration, soft tissue gas producing infection, postsurgical change from recent toe resection. Correlate clinically to this site. Other signs of infection and or inflammation? 2. Extensive vascular calcification suggesting diabetes and renal failure. Plan     Clinical Impression:  Foot pain, right  (primary encounter diagnosis)  Post-op pain  Acute deep vein thrombosis (DVT) of popliteal vein of right lower extremity ([address]    Disposition:  Discharge  12/6/2019  2:14 pm    Follow-up:  Angelina Mohan,

## 2019-12-10 PROCEDURE — 87077 CULTURE AEROBIC IDENTIFY: CPT | Performed by: SURGERY

## 2019-12-10 PROCEDURE — 87205 SMEAR GRAM STAIN: CPT | Performed by: SURGERY

## 2019-12-10 PROCEDURE — 87070 CULTURE OTHR SPECIMN AEROBIC: CPT | Performed by: SURGERY

## 2019-12-10 PROCEDURE — 87186 SC STD MICRODIL/AGAR DIL: CPT | Performed by: SURGERY

## 2019-12-11 ENCOUNTER — LAB REQUISITION (OUTPATIENT)
Dept: LAB | Facility: HOSPITAL | Age: 69
End: 2019-12-11
Payer: MEDICARE

## 2019-12-11 DIAGNOSIS — N18.6 END STAGE RENAL DISEASE (HCC): ICD-10-CM

## 2019-12-11 DIAGNOSIS — Z99.2 DEPENDENCE ON RENAL DIALYSIS (HCC): ICD-10-CM

## 2019-12-11 DIAGNOSIS — I96 GANGRENE, NOT ELSEWHERE CLASSIFIED (HCC): ICD-10-CM

## 2019-12-24 ENCOUNTER — APPOINTMENT (OUTPATIENT)
Dept: ULTRASOUND IMAGING | Facility: HOSPITAL | Age: 69
DRG: 270 | End: 2019-12-24
Attending: SURGERY
Payer: MEDICARE

## 2019-12-24 ENCOUNTER — HOSPITAL ENCOUNTER (INPATIENT)
Facility: HOSPITAL | Age: 69
LOS: 6 days | Discharge: HOME HEALTH | DRG: 270 | End: 2019-12-30
Attending: SURGERY | Admitting: SURGERY
Payer: MEDICARE

## 2019-12-24 ENCOUNTER — ANESTHESIA (OUTPATIENT)
Dept: SURGERY | Facility: HOSPITAL | Age: 69
DRG: 270 | End: 2019-12-24
Payer: MEDICARE

## 2019-12-24 ENCOUNTER — ANESTHESIA EVENT (OUTPATIENT)
Dept: SURGERY | Facility: HOSPITAL | Age: 69
DRG: 270 | End: 2019-12-24
Payer: MEDICARE

## 2019-12-24 DIAGNOSIS — I96 DRY GANGRENE (HCC): ICD-10-CM

## 2019-12-24 PROCEDURE — 90935 HEMODIALYSIS ONE EVALUATION: CPT

## 2019-12-24 PROCEDURE — 82962 GLUCOSE BLOOD TEST: CPT

## 2019-12-24 PROCEDURE — 85610 PROTHROMBIN TIME: CPT | Performed by: SURGERY

## 2019-12-24 PROCEDURE — 87076 CULTURE ANAEROBE IDENT EACH: CPT | Performed by: SURGERY

## 2019-12-24 PROCEDURE — 87205 SMEAR GRAM STAIN: CPT | Performed by: SURGERY

## 2019-12-24 PROCEDURE — 0JBQ0ZZ EXCISION OF RIGHT FOOT SUBCUTANEOUS TISSUE AND FASCIA, OPEN APPROACH: ICD-10-PCS | Performed by: SURGERY

## 2019-12-24 PROCEDURE — 93926 LOWER EXTREMITY STUDY: CPT | Performed by: SURGERY

## 2019-12-24 PROCEDURE — 0Y6M0ZD DETACHMENT AT RIGHT FOOT, PARTIAL 4TH RAY, OPEN APPROACH: ICD-10-PCS | Performed by: SURGERY

## 2019-12-24 PROCEDURE — 88311 DECALCIFY TISSUE: CPT | Performed by: SURGERY

## 2019-12-24 PROCEDURE — 0Y6M0ZF DETACHMENT AT RIGHT FOOT, PARTIAL 5TH RAY, OPEN APPROACH: ICD-10-PCS | Performed by: SURGERY

## 2019-12-24 PROCEDURE — 88305 TISSUE EXAM BY PATHOLOGIST: CPT | Performed by: SURGERY

## 2019-12-24 PROCEDURE — 87186 SC STD MICRODIL/AGAR DIL: CPT | Performed by: SURGERY

## 2019-12-24 PROCEDURE — 36415 COLL VENOUS BLD VENIPUNCTURE: CPT | Performed by: SURGERY

## 2019-12-24 PROCEDURE — 84132 ASSAY OF SERUM POTASSIUM: CPT | Performed by: SURGERY

## 2019-12-24 PROCEDURE — 87070 CULTURE OTHR SPECIMN AEROBIC: CPT | Performed by: SURGERY

## 2019-12-24 PROCEDURE — 87077 CULTURE AEROBIC IDENTIFY: CPT | Performed by: SURGERY

## 2019-12-24 PROCEDURE — 87176 TISSUE HOMOGENIZATION CULTR: CPT | Performed by: SURGERY

## 2019-12-24 PROCEDURE — 87075 CULTR BACTERIA EXCEPT BLOOD: CPT | Performed by: SURGERY

## 2019-12-24 RX ORDER — METOCLOPRAMIDE 10 MG/1
10 TABLET ORAL ONCE
Status: DISCONTINUED | OUTPATIENT
Start: 2019-12-24 | End: 2019-12-24 | Stop reason: HOSPADM

## 2019-12-24 RX ORDER — ONDANSETRON 2 MG/ML
INJECTION INTRAMUSCULAR; INTRAVENOUS AS NEEDED
Status: DISCONTINUED | OUTPATIENT
Start: 2019-12-24 | End: 2019-12-24 | Stop reason: SURG

## 2019-12-24 RX ORDER — ACETAMINOPHEN 500 MG
1000 TABLET ORAL ONCE
Status: COMPLETED | OUTPATIENT
Start: 2019-12-24 | End: 2019-12-24

## 2019-12-24 RX ORDER — HYDROCODONE BITARTRATE AND ACETAMINOPHEN 10; 325 MG/1; MG/1
1 TABLET ORAL EVERY 4 HOURS PRN
Status: DISCONTINUED | OUTPATIENT
Start: 2019-12-24 | End: 2019-12-30

## 2019-12-24 RX ORDER — ISOSORBIDE MONONITRATE 60 MG/1
60 TABLET, EXTENDED RELEASE ORAL DAILY
Status: DISCONTINUED | OUTPATIENT
Start: 2019-12-24 | End: 2019-12-30

## 2019-12-24 RX ORDER — HYDRALAZINE HYDROCHLORIDE 50 MG/1
100 TABLET, FILM COATED ORAL 3 TIMES DAILY
Status: DISCONTINUED | OUTPATIENT
Start: 2019-12-24 | End: 2019-12-30

## 2019-12-24 RX ORDER — HYDROMORPHONE HYDROCHLORIDE 1 MG/ML
0.4 INJECTION, SOLUTION INTRAMUSCULAR; INTRAVENOUS; SUBCUTANEOUS EVERY 5 MIN PRN
Status: DISCONTINUED | OUTPATIENT
Start: 2019-12-24 | End: 2019-12-24 | Stop reason: HOSPADM

## 2019-12-24 RX ORDER — METOPROLOL TARTRATE 5 MG/5ML
2.5 INJECTION INTRAVENOUS ONCE
Status: DISCONTINUED | OUTPATIENT
Start: 2019-12-24 | End: 2019-12-24 | Stop reason: HOSPADM

## 2019-12-24 RX ORDER — SPIRONOLACTONE 25 MG/1
25 TABLET ORAL DAILY
Status: DISCONTINUED | OUTPATIENT
Start: 2019-12-24 | End: 2019-12-30

## 2019-12-24 RX ORDER — ALBUMIN (HUMAN) 12.5 G/50ML
100 SOLUTION INTRAVENOUS AS NEEDED
Status: DISCONTINUED | OUTPATIENT
Start: 2019-12-24 | End: 2019-12-30

## 2019-12-24 RX ORDER — DEXAMETHASONE SODIUM PHOSPHATE 4 MG/ML
VIAL (ML) INJECTION AS NEEDED
Status: DISCONTINUED | OUTPATIENT
Start: 2019-12-24 | End: 2019-12-24 | Stop reason: SURG

## 2019-12-24 RX ORDER — HYDROCODONE BITARTRATE AND ACETAMINOPHEN 5; 325 MG/1; MG/1
1 TABLET ORAL AS NEEDED
Status: DISCONTINUED | OUTPATIENT
Start: 2019-12-24 | End: 2019-12-24 | Stop reason: HOSPADM

## 2019-12-24 RX ORDER — CARVEDILOL 6.25 MG/1
6.25 TABLET ORAL 2 TIMES DAILY WITH MEALS
Status: DISCONTINUED | OUTPATIENT
Start: 2019-12-24 | End: 2019-12-30

## 2019-12-24 RX ORDER — INSULIN ASPART 100 [IU]/ML
5 INJECTION, SOLUTION INTRAVENOUS; SUBCUTANEOUS ONCE
Status: COMPLETED | OUTPATIENT
Start: 2019-12-24 | End: 2019-12-24

## 2019-12-24 RX ORDER — DEXTROSE MONOHYDRATE 25 G/50ML
50 INJECTION, SOLUTION INTRAVENOUS
Status: DISCONTINUED | OUTPATIENT
Start: 2019-12-24 | End: 2019-12-24 | Stop reason: HOSPADM

## 2019-12-24 RX ORDER — HEPARIN SODIUM 1000 [USP'U]/ML
1.5 INJECTION, SOLUTION INTRAVENOUS; SUBCUTANEOUS ONCE
Status: DISCONTINUED | OUTPATIENT
Start: 2019-12-24 | End: 2019-12-30

## 2019-12-24 RX ORDER — WARFARIN SODIUM 3 MG/1
1.5 TABLET ORAL NIGHTLY
Status: DISCONTINUED | OUTPATIENT
Start: 2019-12-24 | End: 2019-12-25

## 2019-12-24 RX ORDER — FAMOTIDINE 20 MG/1
20 TABLET ORAL ONCE
Status: DISCONTINUED | OUTPATIENT
Start: 2019-12-24 | End: 2019-12-24 | Stop reason: HOSPADM

## 2019-12-24 RX ORDER — MORPHINE SULFATE 4 MG/ML
2 INJECTION, SOLUTION INTRAMUSCULAR; INTRAVENOUS EVERY 10 MIN PRN
Status: DISCONTINUED | OUTPATIENT
Start: 2019-12-24 | End: 2019-12-24 | Stop reason: HOSPADM

## 2019-12-24 RX ORDER — HYDROMORPHONE HYDROCHLORIDE 1 MG/ML
0.2 INJECTION, SOLUTION INTRAMUSCULAR; INTRAVENOUS; SUBCUTANEOUS EVERY 5 MIN PRN
Status: DISCONTINUED | OUTPATIENT
Start: 2019-12-24 | End: 2019-12-24 | Stop reason: HOSPADM

## 2019-12-24 RX ORDER — SODIUM CHLORIDE, SODIUM LACTATE, POTASSIUM CHLORIDE, CALCIUM CHLORIDE 600; 310; 30; 20 MG/100ML; MG/100ML; MG/100ML; MG/100ML
INJECTION, SOLUTION INTRAVENOUS CONTINUOUS
Status: DISCONTINUED | OUTPATIENT
Start: 2019-12-24 | End: 2019-12-24

## 2019-12-24 RX ORDER — AMIODARONE HYDROCHLORIDE 200 MG/1
100 TABLET ORAL NIGHTLY
Status: DISCONTINUED | OUTPATIENT
Start: 2019-12-24 | End: 2019-12-30

## 2019-12-24 RX ORDER — PROCHLORPERAZINE EDISYLATE 5 MG/ML
5 INJECTION INTRAMUSCULAR; INTRAVENOUS ONCE AS NEEDED
Status: DISCONTINUED | OUTPATIENT
Start: 2019-12-24 | End: 2019-12-24 | Stop reason: HOSPADM

## 2019-12-24 RX ORDER — ASPIRIN 81 MG/1
81 TABLET ORAL DAILY
Status: DISCONTINUED | OUTPATIENT
Start: 2019-12-24 | End: 2019-12-30

## 2019-12-24 RX ORDER — LIDOCAINE HYDROCHLORIDE 10 MG/ML
INJECTION, SOLUTION EPIDURAL; INFILTRATION; INTRACAUDAL; PERINEURAL AS NEEDED
Status: DISCONTINUED | OUTPATIENT
Start: 2019-12-24 | End: 2019-12-24 | Stop reason: SURG

## 2019-12-24 RX ORDER — FUROSEMIDE 40 MG/1
40 TABLET ORAL DAILY
Status: DISCONTINUED | OUTPATIENT
Start: 2019-12-24 | End: 2019-12-30

## 2019-12-24 RX ORDER — HYDROCODONE BITARTRATE AND ACETAMINOPHEN 5; 325 MG/1; MG/1
2 TABLET ORAL AS NEEDED
Status: DISCONTINUED | OUTPATIENT
Start: 2019-12-24 | End: 2019-12-24 | Stop reason: HOSPADM

## 2019-12-24 RX ORDER — MORPHINE SULFATE 4 MG/ML
4 INJECTION, SOLUTION INTRAMUSCULAR; INTRAVENOUS EVERY 10 MIN PRN
Status: DISCONTINUED | OUTPATIENT
Start: 2019-12-24 | End: 2019-12-24 | Stop reason: HOSPADM

## 2019-12-24 RX ORDER — MORPHINE SULFATE 10 MG/ML
6 INJECTION, SOLUTION INTRAMUSCULAR; INTRAVENOUS EVERY 10 MIN PRN
Status: DISCONTINUED | OUTPATIENT
Start: 2019-12-24 | End: 2019-12-24 | Stop reason: HOSPADM

## 2019-12-24 RX ORDER — WARFARIN SODIUM 3 MG/1
6 TABLET ORAL NIGHTLY
COMMUNITY

## 2019-12-24 RX ORDER — SODIUM CHLORIDE, SODIUM LACTATE, POTASSIUM CHLORIDE, CALCIUM CHLORIDE 600; 310; 30; 20 MG/100ML; MG/100ML; MG/100ML; MG/100ML
INJECTION, SOLUTION INTRAVENOUS CONTINUOUS
Status: DISCONTINUED | OUTPATIENT
Start: 2019-12-24 | End: 2019-12-30

## 2019-12-24 RX ORDER — HYDROCODONE BITARTRATE AND ACETAMINOPHEN 10; 325 MG/1; MG/1
1 TABLET ORAL EVERY 4 HOURS PRN
Status: DISCONTINUED | OUTPATIENT
Start: 2019-12-24 | End: 2019-12-24

## 2019-12-24 RX ORDER — CEFAZOLIN SODIUM/WATER 2 G/20 ML
2 SYRINGE (ML) INTRAVENOUS ONCE
Status: COMPLETED | OUTPATIENT
Start: 2019-12-24 | End: 2019-12-24

## 2019-12-24 RX ORDER — ONDANSETRON 2 MG/ML
4 INJECTION INTRAMUSCULAR; INTRAVENOUS ONCE AS NEEDED
Status: DISCONTINUED | OUTPATIENT
Start: 2019-12-24 | End: 2019-12-24 | Stop reason: HOSPADM

## 2019-12-24 RX ORDER — ATORVASTATIN CALCIUM 40 MG/1
80 TABLET, FILM COATED ORAL NIGHTLY
Status: DISCONTINUED | OUTPATIENT
Start: 2019-12-24 | End: 2019-12-30

## 2019-12-24 RX ORDER — NALOXONE HYDROCHLORIDE 0.4 MG/ML
80 INJECTION, SOLUTION INTRAMUSCULAR; INTRAVENOUS; SUBCUTANEOUS AS NEEDED
Status: DISCONTINUED | OUTPATIENT
Start: 2019-12-24 | End: 2019-12-24 | Stop reason: HOSPADM

## 2019-12-24 RX ORDER — HYDROMORPHONE HYDROCHLORIDE 1 MG/ML
0.6 INJECTION, SOLUTION INTRAMUSCULAR; INTRAVENOUS; SUBCUTANEOUS EVERY 5 MIN PRN
Status: DISCONTINUED | OUTPATIENT
Start: 2019-12-24 | End: 2019-12-24 | Stop reason: HOSPADM

## 2019-12-24 RX ADMIN — SODIUM CHLORIDE, SODIUM LACTATE, POTASSIUM CHLORIDE, CALCIUM CHLORIDE: 600; 310; 30; 20 INJECTION, SOLUTION INTRAVENOUS at 09:00:00

## 2019-12-24 RX ADMIN — LIDOCAINE HYDROCHLORIDE 25 MG: 10 INJECTION, SOLUTION EPIDURAL; INFILTRATION; INTRACAUDAL; PERINEURAL at 08:12:00

## 2019-12-24 RX ADMIN — DEXAMETHASONE SODIUM PHOSPHATE 4 MG: 4 MG/ML VIAL (ML) INJECTION at 08:12:00

## 2019-12-24 RX ADMIN — CEFAZOLIN SODIUM/WATER 2 G: 2 G/20 ML SYRINGE (ML) INTRAVENOUS at 08:07:00

## 2019-12-24 RX ADMIN — SODIUM CHLORIDE, SODIUM LACTATE, POTASSIUM CHLORIDE, CALCIUM CHLORIDE: 600; 310; 30; 20 INJECTION, SOLUTION INTRAVENOUS at 08:07:00

## 2019-12-24 RX ADMIN — ONDANSETRON 4 MG: 2 INJECTION INTRAMUSCULAR; INTRAVENOUS at 08:12:00

## 2019-12-24 NOTE — CONSULTS
[facility] HOSP - [facility]    Report of Consultation    Twyla Colón Patient Status:  Surgery Admit - Inpt    1950 MRN [id number]   Location [facility] UNIT Attending Willam Burgess MD   Hosp Day # 0 PCP Sergo Sneed MD, Q15 Min PRN    Or  Glucose-Vitamin C (DEX-4) chewable tab 4 tablet, 4 tablet, Oral, Q15 Min PRN    Or  dextrose 50 % injection 50 mL, 50 mL, Intravenous, Q15 Min PRN    Or  glucose (DEX4) oral liquid 30 g, 30 g, Oral, Q15 Min PRN    Or  Glucose-Vitamin C ( Intracatheter, Once      Warfarin Sodium 1 MG Oral Tab, Take 1.5 mg by mouth nightly. HYDROcodone-acetaminophen  MG Oral Tab, Take 1 tablet by mouth every 4 (four) hours as needed.   carvedilol 6.25 MG Oral Tab, Take 6.25 mg by mouth 2 (two) times da 171 (H) 10/11/2019    TP 7.8 10/11/2019    AST 34 10/11/2019    ALT 33 10/11/2019    PTT 40.2 (H) 07/25/2019    INR 1.32 (H) 12/24/2019    PTP 16.3 (H) 12/24/2019    TSH 1.530 07/25/2019    ESRML 88 (H) 11/25/2019    CRP 3.76 (H) 11/25/2019    PHOS 5.3 (H)

## 2019-12-24 NOTE — ANESTHESIA PREPROCEDURE EVALUATION
Anesthesia PreOp Note    HPI:     Vicky Carlos is a 71year old male who presents for preoperative consultation requested by: Charles Rey MD    Date of Surgery: 12/24/2019    Procedure(s):  TOE AMPUTATION  Indication: Dry gangrene ([facility] [address] Carlos A Ceja at [facility] OR   • A.V. FISTULA Left 7/26/2019    Performed by Raleigh Castro MD at [facility] OR   • CABG  09/2015   • COLONOSCOPY N/A 7/29/2019    Performed by Loraine Grijalva MD at [facility] ENDOSCOPY   • ESOPHAGOGASTRODUODENOSCOPY (EGD) N/A 7/29/2019    Perform sodium (ANCEF/KEFZOL) 2 GM/20ML premix IV syringe 2 g, 2 g, Intravenous, Once, Willard Hogue MD    No current [facility]-ordered outpatient medications on file.         Metformin               PAIN    Comment:Terrible upset stomach    Family History   Family his 29.0 (L) 12/06/2019    MCV 94.5 12/06/2019    MCH 31.3 12/06/2019    MCHC 33.1 12/06/2019    RDW 15.1 (H) 12/06/2019    .0 12/06/2019     Lab Results   Component Value Date     (L) 12/06/2019    K 3.3 (L) 12/06/2019    CL 95 (L) 12/06/2019

## 2019-12-24 NOTE — ANESTHESIA PROCEDURE NOTES
Airway  Urgency: Elective      General Information and Staff    Patient location during procedure: OR  Anesthesiologist: Asher Dee MD  Performed: anesthesiologist     Indications and Patient Condition  Indications for airway management: anesthesia

## 2019-12-24 NOTE — ANESTHESIA POSTPROCEDURE EVALUATION
Patient: Twyla Colón    Procedure Summary     Date:  12/24/19 Room / Location:  [address] [place] [address] MAIN OR 05 / [address] [place] [address] MAIN OR    Anesthesia Start:  0065 Anesthesia Stop:  9194    Procedure:  TOE AMPUTATION (Right Toe) Diagnosis:       Dry gangrene (HCC)      (Dry malik

## 2019-12-24 NOTE — H&P
History & Physical Examination    Patient Name: Estefani Peres  MRN: [id number]  [facility]: [id number]  YOB: 1950    Diagnosis: Dry gangrene ([address] [place]    History Present Illness: Patient is a 71year old male  has a past medical history of Ath tablet (100 mg total) by mouth 3 (three) times daily. , Disp: 30 tablet, Rfl: 0, 12/23/2019 at 1500      lactated ringers infusion, , Intravenous, Continuous  metoprolol Tartrate (LOPRESSOR) tab 25 mg, 25 mg, Oral, Once PRN  famoTIDine (PEPCID) tab 20 mg, 2 metatarsal; avf patent left upper arm   OTHER        [ x ] I have discussed the risks of the planned operation including bleeding, infection, limb loss, temporary or permanent nerve injury, scarring, disfigurement, need for further operation with the patie

## 2019-12-24 NOTE — BRIEF OP NOTE
[facility] [address] UNIT  Brief Op Note       Patients Name: Margie Josh  Attending Physician: Lolis Nascimento MD  Operating Physician: Sammy Oglesby MD  CSN: [id number]     Location:  OR  MRN: [id number]    YOB: 1950

## 2019-12-24 NOTE — PROGRESS NOTES
[facility]  Progress Note      Nohemi Drake Patient Status:  Inpatient    1950 MRN [id number]   Location [facility] 4W/SW/SE Attending Eliazar Oakley MD   Hosp Day # 0 PCP Kassi Maza MD, Deandre Little MD     71year-old, histor

## 2019-12-24 NOTE — CONSULTS
INFECTIOUS DISEASE CONSULT NOTE    Blaze Obey Patient Status:  Surgery Admit - Inpt    1950 MRN [id number]   Location [facility] UNIT Attending Liam Peters Stroke [facility]) 2015   • Visual impairment     READERS     Past Surgical History:   Procedure Laterality Date   • A.V. FISTULA Left 11/16/2019    Performed by Justin Herman MD at [facility] OR   • A.V. FISTULA Left 7/26/2019    Performed by Justin Herman MD a (DILAUDID) 1 MG/ML injection 0.6 mg, 0.6 mg, Intravenous, Q5 Min PRN  •  morphINE sulfate (PF) 4 MG/ML injection 2 mg, 2 mg, Intravenous, Q10 Min PRN  •  morphINE sulfate (PF) 4 MG/ML injection 4 mg, 4 mg, Intravenous, Q10 Min PRN  •  morphINE sulfate (PF) temperature source Oral, resp. rate 16, height 167.6 cm (5' 6\"), weight 160 lb 3.2 oz (72.7 kg), SpO2 100 %. General: Alert, oriented, NAD  HEENT: Moist mucous membranes. EOMI. No oral lesions  Neck: No lymphadenopathy. Supple.   Respiratory: Clear to R 4th toe wet gangrene s/p R 4th/5th toe metatarsal amputation 12/24 - pathology and cx pending   - recent cx 12/10 with PSAR  # R 5th toe gangrene s/p amputation 11/26/19 with pathology consistent with acute OM and negative margin  # PAD s/p RLE angioplas

## 2019-12-25 PROCEDURE — 85610 PROTHROMBIN TIME: CPT | Performed by: INTERNAL MEDICINE

## 2019-12-25 PROCEDURE — 82962 GLUCOSE BLOOD TEST: CPT

## 2019-12-25 RX ORDER — DEXTROSE MONOHYDRATE 25 G/50ML
50 INJECTION, SOLUTION INTRAVENOUS AS NEEDED
Status: DISCONTINUED | OUTPATIENT
Start: 2019-12-25 | End: 2019-12-30

## 2019-12-25 RX ORDER — WARFARIN SODIUM 3 MG/1
3 TABLET ORAL
Status: COMPLETED | OUTPATIENT
Start: 2019-12-25 | End: 2019-12-25

## 2019-12-25 RX ORDER — SODIUM HYPOCHLORITE 1.25 MG/ML
SOLUTION TOPICAL 2 TIMES DAILY
Status: DISCONTINUED | OUTPATIENT
Start: 2019-12-25 | End: 2019-12-30

## 2019-12-25 RX ORDER — SODIUM CHLORIDE 9 MG/ML
INJECTION, SOLUTION INTRAVENOUS CONTINUOUS
Status: DISCONTINUED | OUTPATIENT
Start: 2019-12-25 | End: 2019-12-30

## 2019-12-25 RX ORDER — WARFARIN SODIUM 3 MG/1
1.5 TABLET ORAL NIGHTLY
Status: DISCONTINUED | OUTPATIENT
Start: 2019-12-26 | End: 2019-12-30

## 2019-12-25 NOTE — PLAN OF CARE
Problem: Patient Centered Care  Goal: Patient preferences are identified and integrated in the patient's plan of care  Description  Interventions:  - What would you like us to know as we care for you?  - Provide timely, complete, and accurate information for physical needs  - Identify cognitive and physical deficits and behaviors that affect risk of falls.   - [place] fall precautions as indicated by assessment.  - Educate pt/family on patient safety including physical limitations  - Instruct pt to call f Angina  - Evaluate fluid balance, assess for edema, trend weights  Outcome: Progressing  Goal: Absence of cardiac arrhythmias or at baseline  Description  INTERVENTIONS:  - Continuous cardiac monitoring, monitor vital signs, obtain 12 lead EKG if indicated fluid and nutrition restrictions as appropriate  Outcome: Progressing     Problem: SKIN/TISSUE INTEGRITY - ADULT  Goal: Skin integrity remains intact  Description  INTERVENTIONS  - Assess and document risk factors for pressure ulcer development  - Assess a precautions (e.g. spinal or hip dislocation precautions)  Outcome: Progressing     Problem: Impaired Functional Mobility  Goal: Achieve highest/safest level of mobility/gait  Description  Interventions:  - Assess patient's functional ability and stability

## 2019-12-25 NOTE — PROGRESS NOTES
[facility] - [facility]    Progress Note    Jevon Marte Patient Status:  Inpatient    1950 MRN [id number]   Location [facility] 4W/SW/SE Attending Jayson Haines MD   Hosp Day # 1 PCP Andrew Flores MD, Radha Rios MD        Subjective: 10/11/2019    AST 34 10/11/2019    ALT 33 10/11/2019    PTT 40.2 (H) 07/25/2019    INR 1.32 (H) 12/24/2019    TSH 1.530 07/25/2019    ESRML 88 (H) 11/25/2019    CRP 3.76 (H) 11/25/2019    PHOS 5.3 (H) 11/26/2019    B12 551 07/30/2019       Us Arterial Dupl

## 2019-12-25 NOTE — PLAN OF CARE
Problem: Patient Centered Care  Goal: Patient preferences are identified and integrated in the patient's plan of care  Description  Interventions:  - What would you like us to know as we care for you?  - Provide timely, complete, and accurate information for physical needs  - Identify cognitive and physical deficits and behaviors that affect risk of falls.   - [place] fall precautions as indicated by assessment.  - Educate pt/family on patient safety including physical limitations  - Instruct pt to call f Angina  - Evaluate fluid balance, assess for edema, trend weights  Outcome: Progressing  Goal: Absence of cardiac arrhythmias or at baseline  Description  INTERVENTIONS:  - Continuous cardiac monitoring, monitor vital signs, obtain 12 lead EKG if indicated fluid and nutrition restrictions as appropriate  Outcome: Progressing     Problem: SKIN/TISSUE INTEGRITY - ADULT  Goal: Skin integrity remains intact  Description  INTERVENTIONS  - Assess and document risk factors for pressure ulcer development  - Assess a precautions (e.g. spinal or hip dislocation precautions)  Outcome: Progressing     Problem: Impaired Functional Mobility  Goal: Achieve highest/safest level of mobility/gait  Description  Interventions:  - Assess patient's functional ability and stability

## 2019-12-25 NOTE — PROGRESS NOTES
[facility] Pharmacy Note:  Renal Adjustment for piperacillin/tazobactam ([facility])    Lolis Peters is a 71year old male who has been prescribed piperacillin/tazobactam (ZOSYN) 3.375 g every 8 hrs.   Patient is a hemodialysis patient so the dose has been adjuste

## 2019-12-25 NOTE — H&P
[facility] HOSP - [facility]    History and Physical    Kinney Corbin Patient Status:  Inpatient    1950 MRN [id number]   Location [facility] 4W/SW/SE Attending Willard Hogue MD   Hosp Day # 0 PCP Tsering Joshua MD, Iza Alvarado MD     Date:   upset stomach  Warfarin Sodium 1 MG Oral Tab, Take 1.5 mg by mouth nightly. HYDROcodone-acetaminophen  MG Oral Tab, Take 1 tablet by mouth every 4 (four) hours as needed.   carvedilol 6.25 MG Oral Tab, Take 6.25 mg by mouth 2 (two) times daily with m Date    WBC 8.5 12/06/2019    HGB 9.6 (L) 12/06/2019    HCT 29.0 (L) 12/06/2019    .0 12/06/2019    CREATSERUM 2.26 (H) 12/06/2019    BUN 11 12/06/2019     (L) 12/06/2019    K 3.1 (L) 12/24/2019    CL 95 (L) 12/06/2019    CO2 34.0 (H) 12/06/20

## 2019-12-25 NOTE — PROGRESS NOTES
[facility] - [facility]    Progress Note    Blaze Obey Patient Status:  Inpatient    1950 MRN [id number]   Location [facility] 4W/SW/SE Attending Liam Peters MD   Hosp Day # 1 PCP Kobe Miranda MD, Get Akbar MD       Subjective:    Ming 11/25/2019    PHOS 5.3 (H) 11/26/2019    B12 551 07/30/2019        Arterial Duplex Ltd Right Em (cpt=93926)    Result Date: 12/24/2019  CONCLUSION:  1.  There is flow within the previously recanalized posterior tibial artery, however the flow is dampened

## 2019-12-25 NOTE — PLAN OF CARE
A&Ox4. R foot dressing, dry and intact. Some iodine staining. On IV zosyn for abx. Renal, [id number] diet. AC/HS accucheck. Received HD today and pulled off 1L. On room air. Healing sacral wound present, wound care consulted.  L arm fistula and L arm precaution Utilize distraction and/or relaxation techniques  - Monitor for opioid side effects  - Notify MD/LIP if interventions unsuccessful or patient reports new pain  - Anticipate increased pain with activity and pre-medicate as appropriate  Outcome: Progressing

## 2019-12-26 ENCOUNTER — APPOINTMENT (OUTPATIENT)
Dept: INTERVENTIONAL RADIOLOGY/VASCULAR | Facility: HOSPITAL | Age: 69
DRG: 270 | End: 2019-12-26
Attending: RADIOLOGY
Payer: MEDICARE

## 2019-12-26 PROCEDURE — 37232 HC TRANSL ANGIOPLASTY TIBIAL PERONEAL UNIL EA ADDL: CPT

## 2019-12-26 PROCEDURE — 99152 MOD SED SAME PHYS/QHP 5/>YRS: CPT

## 2019-12-26 PROCEDURE — 047R3ZZ DILATION OF RIGHT POSTERIOR TIBIAL ARTERY, PERCUTANEOUS APPROACH: ICD-10-PCS | Performed by: RADIOLOGY

## 2019-12-26 PROCEDURE — 04CR3ZZ EXTIRPATION OF MATTER FROM RIGHT POSTERIOR TIBIAL ARTERY, PERCUTANEOUS APPROACH: ICD-10-PCS | Performed by: RADIOLOGY

## 2019-12-26 PROCEDURE — 85610 PROTHROMBIN TIME: CPT | Performed by: INTERNAL MEDICINE

## 2019-12-26 PROCEDURE — 85347 COAGULATION TIME ACTIVATED: CPT

## 2019-12-26 PROCEDURE — 99153 MOD SED SAME PHYS/QHP EA: CPT

## 2019-12-26 PROCEDURE — B41F1ZZ FLUOROSCOPY OF RIGHT LOWER EXTREMITY ARTERIES USING LOW OSMOLAR CONTRAST: ICD-10-PCS | Performed by: RADIOLOGY

## 2019-12-26 PROCEDURE — 99211 OFF/OP EST MAY X REQ PHY/QHP: CPT

## 2019-12-26 PROCEDURE — 75774 ARTERY X-RAY EACH VESSEL: CPT

## 2019-12-26 PROCEDURE — 82962 GLUCOSE BLOOD TEST: CPT

## 2019-12-26 PROCEDURE — 047T3ZZ DILATION OF RIGHT PERONEAL ARTERY, PERCUTANEOUS APPROACH: ICD-10-PCS | Performed by: RADIOLOGY

## 2019-12-26 PROCEDURE — 75710 ARTERY X-RAYS ARM/LEG: CPT

## 2019-12-26 PROCEDURE — 80048 BASIC METABOLIC PNL TOTAL CA: CPT | Performed by: SURGERY

## 2019-12-26 PROCEDURE — 37229 HC TRANSL ANGIO W ATHERECT TIBIAL PERONEAL UNILAT INIT: CPT

## 2019-12-26 RX ORDER — NITROGLYCERIN 20 MG/100ML
INJECTION INTRAVENOUS
Status: COMPLETED
Start: 2019-12-26 | End: 2019-12-26

## 2019-12-26 RX ORDER — HEPARIN SODIUM 1000 [USP'U]/ML
1.5 INJECTION, SOLUTION INTRAVENOUS; SUBCUTANEOUS ONCE
Status: DISCONTINUED | OUTPATIENT
Start: 2019-12-27 | End: 2019-12-30

## 2019-12-26 RX ORDER — LIDOCAINE HYDROCHLORIDE 20 MG/ML
INJECTION, SOLUTION EPIDURAL; INFILTRATION; INTRACAUDAL; PERINEURAL
Status: COMPLETED
Start: 2019-12-26 | End: 2019-12-26

## 2019-12-26 RX ORDER — ALBUMIN (HUMAN) 12.5 G/50ML
100 SOLUTION INTRAVENOUS AS NEEDED
Status: DISCONTINUED | OUTPATIENT
Start: 2019-12-27 | End: 2019-12-30

## 2019-12-26 RX ORDER — MIDAZOLAM HYDROCHLORIDE 1 MG/ML
INJECTION INTRAMUSCULAR; INTRAVENOUS
Status: COMPLETED
Start: 2019-12-26 | End: 2019-12-26

## 2019-12-26 RX ORDER — MIDAZOLAM HYDROCHLORIDE 1 MG/ML
INJECTION INTRAMUSCULAR; INTRAVENOUS
Status: DISCONTINUED
Start: 2019-12-26 | End: 2019-12-26 | Stop reason: WASHOUT

## 2019-12-26 RX ORDER — HEPARIN SODIUM 1000 [USP'U]/ML
INJECTION, SOLUTION INTRAVENOUS; SUBCUTANEOUS
Status: COMPLETED
Start: 2019-12-26 | End: 2019-12-26

## 2019-12-26 NOTE — BRIEF PROCEDURE NOTE
[facility] HOSP - [facility]  Procedure Note    Margarita Pino Patient Status:  Inpatient    1950 MRN [id number]   Location [facility] Attending Ana Camilo MD   Hosp Day # 2 PCP Mohini Whittington MD, Minh Steven MD     Pro

## 2019-12-26 NOTE — WOUND PROGRESS NOTE
Wound Care Services  Attempting to see the pt., he is in the cath lab. Will re-see spoke with the pt's nurse.

## 2019-12-26 NOTE — OPERATIVE REPORT
[facility]    PATIENT'S NAME: Sara WALLER   ATTENDING PHYSICIAN: Olivia Decker MD   OPERATING PHYSICIAN: Olivia Decker MD   PATIENT ACCOUNT#:   [de-identified]    LOCATION:  [facility] PACU [address] [address]  MEDICAL RECORD #:   [id number]       DINORAH admit him afterwards for excising the area of soft tissue infection and starting him on IV antibiotics and local wound care.     OPERATIVE TECHNIQUE:  The patient identified and then properly marked, brought to the operating room and placed in supine positi 09:40:14  t: 12/24/2019 10:48:09  [facility] [id number]  RRK/

## 2019-12-26 NOTE — PHYSICAL THERAPY NOTE
Attempted to see patient for physical therapy evaluation. Patient declined participation in physical therapy stating he had too much pain and despite maximal education from RN and PT, patient continued to decline.  Will attempt to see patient tomorrow for p

## 2019-12-26 NOTE — PLAN OF CARE
Patient continues with elevated blood sugars, continue to monitor per MD.  Hydralazine held last evening due to borderline hypotension. Norco for pain with good result. Patient to have angiogram this AM, HD to potentially follow.     Problem: METABOLIC/FL planning and delivery of care  - Encourage patient/family to participate in care and decision-making at the level they choose  - Honor patient and family perspectives and choices   Outcome: Progressing     Problem: Patient/Family Goals  Goal: Patient/Famil devices as appropriate  - Consider OT/PT consult to assist with strengthening/mobility  - Encourage toileting schedule  Outcome: Progressing     Problem: DISCHARGE PLANNING  Goal: Discharge to home or other facility with appropriate resources  Description for life threatening arrhythmias  - Monitor electrolytes and administer replacement therapy as ordered  Outcome: Progressing     Problem: METABOLIC/FLUID AND ELECTROLYTES - ADULT  Goal: Electrolytes maintained within normal limits  Description  INTERVENTIO precautions as appropriate  - Initiate Pressure Ulcer prevention bundle as indicated  Outcome: Progressing     Problem: MUSCULOSKELETAL - ADULT  Goal: Maintain proper alignment of affected body part  Description  INTERVENTIONS:  - Support and protect limb

## 2019-12-26 NOTE — WOUND PROGRESS NOTE
Wound Care Services  Nurse consult to see the pt's left buttock cheek. The pt. is back in his room. The pt. noted that his foot has been in pain and he has been sitting more at home.  Left buttock cheek with small scabbed area, no c/o pain, sanjuanita skin is pin

## 2019-12-26 NOTE — PROGRESS NOTES
[facility]    Progress Note    Celina Emery Patient Status:  Inpatient    1950 MRN [id number]   Location [facility] 4W/SW/SE Attending Noah Maradiaga MD   Hosp Day # 2 PCP Tahira Shabazz MD, Raj Forman MD        Subjective: 12/26/2019    TSH 1.530 07/25/2019    ESRML 88 (H) 11/25/2019    CRP 3.76 (H) 11/25/2019    PHOS 5.3 (H) 11/26/2019    B12 551 07/30/2019                        Alex Curran MD, Yossi Minor MD  12/26/2019

## 2019-12-26 NOTE — PROGRESS NOTES
[facility]    Progress Note    Celina Emery Patient Status:  Inpatient    1950 MRN [id number]   Location [facility] 4W/SW/SE Attending Noah Maradiaga MD   Hosp Day # 2 PCP Tahira Shabazz MD, Raj Forman MD       Subjective:    Ming

## 2019-12-26 NOTE — PROGRESS NOTES
Oozing from left groin site. No hematoma palpated. Dr. Jerman Rivera aware, into see patient. Order received. FEM-O-STOP applied to left groin access site at 100 mmhg. Will monitor at this time.

## 2019-12-26 NOTE — PROGRESS NOTES
Received patient from floor in bed. Consent obtained and verified. Dr. Shaista Kelly in to see patient and patient acknowledges procedure performed. Hand off to IronPearl RNIKKI.   Transferred to Interventional Radiology lab 4

## 2019-12-26 NOTE — PROGRESS NOTES
Fem-O-Stop removed. Hemostasis achieved. Steri Strips applied to site. tegaderm applied to site. No active bleeding or hematoma noted. Report called to OLVIN HERNANDEZ.   Transferred to room 446

## 2019-12-27 PROCEDURE — 82962 GLUCOSE BLOOD TEST: CPT

## 2019-12-27 PROCEDURE — 80053 COMPREHEN METABOLIC PANEL: CPT | Performed by: INTERNAL MEDICINE

## 2019-12-27 PROCEDURE — 90935 HEMODIALYSIS ONE EVALUATION: CPT

## 2019-12-27 PROCEDURE — 85025 COMPLETE CBC W/AUTO DIFF WBC: CPT | Performed by: INTERNAL MEDICINE

## 2019-12-27 PROCEDURE — 97162 PT EVAL MOD COMPLEX 30 MIN: CPT

## 2019-12-27 PROCEDURE — 85610 PROTHROMBIN TIME: CPT | Performed by: INTERNAL MEDICINE

## 2019-12-27 PROCEDURE — 97530 THERAPEUTIC ACTIVITIES: CPT

## 2019-12-27 RX ORDER — LACTULOSE 10 G/15ML
30 SOLUTION ORAL DAILY
Status: DISCONTINUED | OUTPATIENT
Start: 2019-12-27 | End: 2019-12-30

## 2019-12-27 NOTE — PHYSICAL THERAPY NOTE
PHYSICAL THERAPY EVALUATION - INPATIENT     Room Number: 446/446-A  Evaluation Date: 12/27/2019  Type of Evaluation: Initial   Physician Order: PT Eval and Treat(right heel walking)    Presenting Problem: right foot debridement of wet gangrene and 4th & 5 Approx Degree of Impairment: 57.7% has been calculated based on documentation in the [facility] '6 clicks' Inpatient Basic Mobility Short Form. Research supports that patients with this level of impairment may benefit from BRAYDEN.  However, he can likely go home a Problems:    Dry gangrene [facility])      Past Medical History  Past Medical History:   Diagnosis Date   • Atherosclerosis of coronary artery    • Coronary atherosclerosis 09/2015    CABG X3   • Diabetes [facility])    • Dialysis patient [facility])    • Essential hyperten (Comment)(heel walking only)       PAIN ASSESSMENT  Ratin  Location: right LE  Management Techniques: Activity promotion;Relaxation;Repositioning; Other (Comment)(pt declines pain meds due to constipation)    COGNITION  · Overall Cognitive Status:  [facility] assistance(to maintain his heel WB)  Distance (ft): 15'x2  Assistive Device: Other (Comment)(rollator)  Pattern: Shuffle;R Foot drag;L Foot drag  Stoop/Curb Assistance: Not tested(pt feels too weak to try today)  Comment : Patient ambulate with post-op quoc assisting pt up/down in a w/c   Goal #4   Current Status    Goal #5 Patient to demonstrate independence with home activity/exercise instructions provided to patient in preparation for discharge.    Goal #5   Current Status    Goal #6    Goal #6  Current Sta

## 2019-12-27 NOTE — PLAN OF CARE
Problem: Patient Centered Care  Goal: Patient preferences are identified and integrated in the patient's plan of care  Description  Interventions:  - Provide timely, complete, and accurate information to patient/family  - Incorporate patient and family k deficits and behaviors that affect risk of falls.   - [place] fall precautions as indicated by assessment.  - Educate pt/family on patient safety including physical limitations  - Instruct pt to call for assistance with activity based on assessment  - Mod weights  Outcome: Progressing  Goal: Absence of cardiac arrhythmias or at baseline  Description  INTERVENTIONS:  - Continuous cardiac monitoring, monitor vital signs, obtain 12 lead EKG if indicated  - Evaluate effectiveness of antiarrhythmic and heart rat Progressing     Problem: SKIN/TISSUE INTEGRITY - ADULT  Goal: Skin integrity remains intact  Description  INTERVENTIONS  - Assess and document risk factors for pressure ulcer development  - Assess and document skin integrity  - Monitor for areas of redness Progressing     Problem: Impaired Functional Mobility  Goal: Achieve highest/safest level of mobility/gait  Description  Interventions:  - Assess patient's functional ability and stability  - Promote increasing activity/tolerance for mobility and gait  - E

## 2019-12-27 NOTE — CM/SW NOTE
1415: CM was notified by physical therapist Pascual Tarango that pt may need WC for home use as pt is not able to maintain weight bearing status of heel walking. Spoke with Madhavi Avitia from [facility]. Informed her of potential dc plan for tomorrow. HME form on chart.   Needs Vitaliy Stout. DC home with family. 12/6 returned to [facility] for rt foot pain. +dvt. Started on Coumadin and PO keflex. Dr. Traci Dockery referred pt to [facility] for wound care. 12/24 followed up with Dr. Traci Dockery in his office and was sent here as a direct admit for I&D.

## 2019-12-27 NOTE — PROGRESS NOTES
[facility] HOSP - [facility]    Progress Note    Tiara Almaguer Patient Status:  Inpatient    1950 MRN [id number]   Location [facility] 4W/SW/SE Attending Tito Jovel MD   Hosp Day # 3 PCP Aniya Barrios MD, Vaibhav Marcum MD        Subjective: AST 11 (L) 12/27/2019    ALT <6 (L) 12/27/2019    PTT 40.2 (H) 07/25/2019    INR 1.79 (H) 12/27/2019    TSH 1.530 07/25/2019    ESRML 88 (H) 11/25/2019    CRP 3.76 (H) 11/25/2019    PHOS 5.3 (H) 11/26/2019    B12 551 07/30/2019                        [facility]

## 2019-12-27 NOTE — PLAN OF CARE
Plan of care reviewed with Will. Patient went for a cardiac cath. this morning. Dressing to left groin in place without signs of bleeding and patient denies pain.  Bed rest for 4 hours completed, however patient refused to work with physical therapy this af assistance  - Assess pain using appropriate pain scale  - Administer analgesics based on type and severity of pain and evaluate response  - Implement non-pharmacological measures as appropriate and evaluate response  - Consider cultural and social influenc as appropriate  - Assess patient's ability to be responsible for managing their own health  - Refer to Case Management Department for coordinating discharge planning if the patient needs post-hospital services based on physician/LIP order or complex needs medications to maintain glucose within target range  - Assess barriers to adequate nutritional intake and initiate nutrition consult as needed  - Instruct patient on self management of diabetes  12/26/2019 1957 by Gracy Dutta, RN  Outcome: Progressing Progressing  12/26/2019 1941 by Yancy Cabot, RN  Outcome: Progressing  Goal: Incision(s), wounds(s) or drain site(s) healing without S/S of infection  Description  INTERVENTIONS:  - Assess and document risk factors for pressure ulcer development  - Asse 1957 by Mayi Bentley RN  Outcome: Progressing  12/26/2019 1941 by Mayi Bentley RN  Outcome: Progressing     Problem: Impaired Functional Mobility  Goal: Achieve highest/safest level of mobility/gait  Description  Interventions:  - Assess patient's fu

## 2019-12-27 NOTE — PROGRESS NOTES
[facility] ID PROGRESS NOTE    Celina Emery Patient Status:  Inpatient    1950 MRN [id number]   Location [facility] 4W/SW/SE Attending Noah Maradiaga MD   Hosp Day # 3 PCP Tahira Shabazz MD, Raj Forman MD     Subjective:  Awake, seen on dialysis.  No it on his walker about 6 weeks ago. Was admitted on Thanksgiving and underwent lower extremity angiogram for right fifth toe gangrene with findings of right PTA occlusion status post angioplasty with improvement.   Was seen by orthopedic surgery and underw

## 2019-12-27 NOTE — PROGRESS NOTES
[facility]    Progress Note    Nohemi Drake Patient Status:  Inpatient    1950 MRN [id number]   Location [facility] 4W/SW/SE Attending Eliazar Oakley MD   Hosp Day # 3 PCP Kassi Maza MD, Deandre Little MD       Subjective:    Ming (H) 11/25/2019    PHOS 5.3 (H) 11/26/2019    B12 551 07/30/2019                   Winnie Page MD  12/27/2019

## 2019-12-28 PROCEDURE — 84466 ASSAY OF TRANSFERRIN: CPT | Performed by: INTERNAL MEDICINE

## 2019-12-28 PROCEDURE — 82962 GLUCOSE BLOOD TEST: CPT

## 2019-12-28 PROCEDURE — 85018 HEMOGLOBIN: CPT | Performed by: INTERNAL MEDICINE

## 2019-12-28 PROCEDURE — 85610 PROTHROMBIN TIME: CPT | Performed by: INTERNAL MEDICINE

## 2019-12-28 PROCEDURE — 82728 ASSAY OF FERRITIN: CPT | Performed by: INTERNAL MEDICINE

## 2019-12-28 PROCEDURE — 83540 ASSAY OF IRON: CPT | Performed by: INTERNAL MEDICINE

## 2019-12-28 NOTE — PROGRESS NOTES
[facility]    Progress Note    David Madrigal Patient Status:  Inpatient    1950 MRN [id number]   Location [facility] 4W/SW/SE Attending Celina Desouza MD   Hosp Day # 4 PCP Josy Castano MD, Eliza Lal MD     Subjective: POD #

## 2019-12-28 NOTE — BRIEF OP NOTE
[address] UNIT  Brief Op Note       Patients Name: Jorge Anglin  Attending Physician: Justin Herman MD  Operating Physician: Precious Krishnamurthy MD  CSN: [id number]     Location:  OR  MRN: [id number]    YOB: 1950

## 2019-12-28 NOTE — PROGRESS NOTES
[facility]    Brief Note    Isha Fernandez Patient Status:  Inpatient    1950 MRN [id number]   Location [facility] 4W/SW/SE Attending Stone Joseph MD   Hosp Day # 4 PCP Jv Waite MD, Ailin Rowland MD     Date of Note:

## 2019-12-28 NOTE — PLAN OF CARE
Problem: Patient Centered Care  Goal: Patient preferences are identified and integrated in the patient's plan of care  Description  Interventions:  - Provide timely, complete, and accurate information to patient/family  - Incorporate patient and family k deficits and behaviors that affect risk of falls.   - [place] fall precautions as indicated by assessment.  - Educate pt/family on patient safety including physical limitations  - Instruct pt to call for assistance with activity based on assessment  - Mod patient for signs and symptoms of electrolyte imbalances  - Administer electrolyte replacement as ordered  - Monitor response to electrolyte replacements, including rhythm and repeat lab results as appropriate  - Fluid restriction as ordered  - Instruct pa treatments as ordered  Outcome: Progressing     Problem: MUSCULOSKELETAL - ADULT  Goal: Return mobility to safest level of function  Description  INTERVENTIONS:  - Assess patient stability and activity tolerance for standing, transferring and ambulating w/ discharge home tomorrow (12/28).

## 2019-12-28 NOTE — PROGRESS NOTES
[facility]    Progress Note    Vicky Carlos Patient Status:  Inpatient    1950 MRN [id number]   [facility] 4W/SW/SE Attending Charles Rey MD   Hosp Day # 4 PCP Hilda Mott MD, Luciana Curran MD        Subjective: 12/27/2019    ALT <6 (L) 12/27/2019    PTT 40.2 (H) 07/25/2019    INR 1.95 (H) 12/28/2019    TSH 1.530 07/25/2019    ESRML 88 (H) 11/25/2019    CRP 3.76 (H) 11/25/2019    PHOS 5.3 (H) 11/26/2019    B12 551 07/30/2019                        Josy Castano MD, [facility]

## 2019-12-28 NOTE — PLAN OF CARE
A&Ox4. Upx1 walker and staff. Boot on right foot when ambulating. Loose BM x1, lactulose held. Renal diet and [id number], tolerating well, but minimal appetite. AC/HS accucheck, insulin coverage as ordered. Left fistula; Left Limb precautions maintained.  Mepil response  - Implement non-pharmacological measures as appropriate and evaluate response  - Consider cultural and social influences on pain and pain management  - Manage/alleviate anxiety  - Utilize distraction and/or relaxation techniques  - Monitor for op system  Outcome: Progressing     Problem: CARDIOVASCULAR - ADULT  Goal: Maintains optimal cardiac output and hemodynamic stability  Description  INTERVENTIONS:  - Monitor vital signs, rhythm, and trends  - Monitor for bleeding, hypotension and signs of dec restrictions as appropriate  Outcome: Progressing  Goal: Hemodynamic stability and optimal renal function maintained  Description  INTERVENTIONS:  - Monitor labs and assess for signs and symptoms of volume excess or deficit  - Monitor intake, output and pa with transfers and ambulation using safe patient handling equipment as needed  - Ensure adequate protection for wounds/incisions during mobilization  - Obtain PT/OT consults as needed  - Advance activity as appropriate  - Communicate ordered activity level

## 2019-12-29 PROCEDURE — 82962 GLUCOSE BLOOD TEST: CPT

## 2019-12-29 NOTE — PLAN OF CARE
Problem: Patient Centered Care  Goal: Patient preferences are identified and integrated in the patient's plan of care  Description  Interventions:  - Provide timely, complete, and accurate information to patient/family  - Incorporate patient and family k deficits and behaviors that affect risk of falls.   - [place] fall precautions as indicated by assessment.  - Educate pt/family on patient safety including physical limitations  - Instruct pt to call for assistance with activity based on assessment  - Mod weights  Outcome: Progressing  Goal: Absence of cardiac arrhythmias or at baseline  Description  INTERVENTIONS:  - Continuous cardiac monitoring, monitor vital signs, obtain 12 lead EKG if indicated  - Evaluate effectiveness of antiarrhythmic and heart rat Progressing     Problem: SKIN/TISSUE INTEGRITY - ADULT  Goal: Skin integrity remains intact  Description  INTERVENTIONS  - Assess and document risk factors for pressure ulcer development  - Assess and document skin integrity  - Monitor for areas of redness Progressing     Problem: Impaired Functional Mobility  Goal: Achieve highest/safest level of mobility/gait  Description  Interventions:  - Assess patient's functional ability and stability  - Promote increasing activity/tolerance for mobility and gait  - E

## 2019-12-29 NOTE — PROGRESS NOTES
[facility]    Progress Note    [facility] Patient Status:  Inpatient    1950 MRN [id number]   Location [facility] 4W/SW/SE Attending Vipul Costa MD   Hosp Day # 5 PCP Cornelia Sellers MD, Dannie Abad MD        Subjective: ALT <6 (L) 12/27/2019    PTT 40.2 (H) 07/25/2019    INR 1.95 (H) 12/28/2019    TSH 1.530 07/25/2019    ESRML 88 (H) 11/25/2019    CRP 3.76 (H) 11/25/2019    PHOS 5.3 (H) 11/26/2019    B12 551 07/30/2019                        Josy Castano MD, Eliza Lal MD  12/2

## 2019-12-29 NOTE — PLAN OF CARE
A&Ox4, pt can be forgetful at times. Upx1 walker and boot. Pt up in chair this evenning. Pt willing to ambulate in room. Renal, [id number], tolerating well. AC/HS accucheck, insulin coverage per order. HOB 30 degrees. BLE elevated with pillow.  On scheduled zo relaxation techniques  - Monitor for opioid side effects  - Notify MD/LIP if interventions unsuccessful or patient reports new pain  - Anticipate increased pain with activity and pre-medicate as appropriate  Outcome: Progressing     Problem: SAFETY ADULT - bleeding, hypotension and signs of decreased cardiac output  - Evaluate effectiveness of vasoactive medications to optimize hemodynamic stability  - Monitor arterial and/or venous puncture sites for bleeding and/or hematoma  - Assess quality of pulses, ski deficit  - Monitor intake, output and patient weight  - Monitor urine specific gravity, serum osmolarity and serum sodium as indicated or ordered  - Monitor response to interventions for patient's volume status, including labs, urine output, blood pressure appropriate  - Communicate ordered activity level and limitations with patient/family  Outcome: Progressing  Goal: Maintain proper alignment of affected body part  Description  INTERVENTIONS:  - Support and protect limb and body alignment per provider's or

## 2019-12-30 VITALS
TEMPERATURE: 99 F | BODY MASS INDEX: 25.74 KG/M2 | HEART RATE: 67 BPM | WEIGHT: 160.19 LBS | OXYGEN SATURATION: 100 % | SYSTOLIC BLOOD PRESSURE: 163 MMHG | DIASTOLIC BLOOD PRESSURE: 74 MMHG | HEIGHT: 66 IN | RESPIRATION RATE: 18 BRPM

## 2019-12-30 PROCEDURE — 90935 HEMODIALYSIS ONE EVALUATION: CPT

## 2019-12-30 PROCEDURE — 82962 GLUCOSE BLOOD TEST: CPT

## 2019-12-30 PROCEDURE — 5A1D70Z PERFORMANCE OF URINARY FILTRATION, INTERMITTENT, LESS THAN 6 HOURS PER DAY: ICD-10-PCS | Performed by: INTERNAL MEDICINE

## 2019-12-30 PROCEDURE — 97530 THERAPEUTIC ACTIVITIES: CPT

## 2019-12-30 PROCEDURE — 97110 THERAPEUTIC EXERCISES: CPT

## 2019-12-30 RX ORDER — ALBUMIN (HUMAN) 12.5 G/50ML
100 SOLUTION INTRAVENOUS AS NEEDED
Status: DISCONTINUED | OUTPATIENT
Start: 2019-12-30 | End: 2019-12-30

## 2019-12-30 RX ORDER — HEPARIN SODIUM 1000 [USP'U]/ML
1.5 INJECTION, SOLUTION INTRAVENOUS; SUBCUTANEOUS ONCE
Status: DISCONTINUED | OUTPATIENT
Start: 2019-12-30 | End: 2019-12-30

## 2019-12-30 NOTE — PLAN OF CARE
Problem: Patient Centered Care  Goal: Patient preferences are identified and integrated in the patient's plan of care  Description  Interventions:  - Provide timely, complete, and accurate information to patient/family  - Incorporate patient and family k deficits and behaviors that affect risk of falls.   - [place] fall precautions as indicated by assessment.  - Educate pt/family on patient safety including physical limitations  - Instruct pt to call for assistance with activity based on assessment  - Mod weights  Outcome: Progressing  Goal: Absence of cardiac arrhythmias or at baseline  Description  INTERVENTIONS:  - Continuous cardiac monitoring, monitor vital signs, obtain 12 lead EKG if indicated  - Evaluate effectiveness of antiarrhythmic and heart rat Progressing     Problem: SKIN/TISSUE INTEGRITY - ADULT  Goal: Skin integrity remains intact  Description  INTERVENTIONS  - Assess and document risk factors for pressure ulcer development  - Assess and document skin integrity  - Monitor for areas of redness Progressing     Problem: Impaired Functional Mobility  Goal: Achieve highest/safest level of mobility/gait  Description  Interventions:  - Assess patient's functional ability and stability  - Promote increasing activity/tolerance for mobility and gait  - E

## 2019-12-30 NOTE — PHYSICAL THERAPY NOTE
PHYSICAL THERAPY TREATMENT NOTE - INPATIENT     Room Number: 446/446-A       Presenting Problem: right foot debridement of wet gangrene and 4th & 5th metatarsal amputation 12/24/19    Problem List  Active Problems:    Dry gangrene ([address]      PHYSICAL THERA on back to sitting on the side of the bed?: A Little   How much help from another person does the patient currently need. ..   -   Moving to and from a bed to a chair (including a wheelchair)?: A Little   -   Need to walk in hospital room?: A Lot   -   Clim Patient will negotiate one curb w/ assistive device and supervision and maintain heel WB on RLE or wife to demonstrate I assisting pt up/down in a w/c   Goal #4   Current Status NT   Goal #5 Patient to demonstrate independence with home activity/exercise i

## 2019-12-30 NOTE — CM/SW NOTE
1550: Resume HHC orders, Next INR draw and wound care orders received. [facility] liaison made aware of orders and dc plan for home today. HME form faxed to Dr. Saran Rebolledo office by Verner Stabler bedside RN.   Per Dr. Saran Rebolledo he will sign HME form and complete docu wheelchair. DC plan: Home today after dialysis with family and HHC. Will need resume home health orders, updated Wound care treatment plan and Next INR draw. [facility] Liaison aware of potential dc today.   HME form on chart for MD live and F2F docum

## 2019-12-30 NOTE — PROGRESS NOTES
[place] [facility] - [facility]    Progress Note    Sam Lazaro Patient Status:  Inpatient    1950 MRN [id number]   Location [facility] 4W/SW/SE Attending Gurmeet Zambrano MD   Hosp Day # 6 PCP Lyn Mohamud MD, Stefano Cline MD       Subjective:    Ming 88 (H) 11/25/2019    CRP 3.76 (H) 11/25/2019    PHOS 5.3 (H) 11/26/2019    B12 551 07/30/2019                   Thien Gomez MD  12/30/2019    HD note   Seen on HD , complete 3 hr Rx   BP stable UF goal 1.5-2 L as tolerated   D/w RN Mo

## 2019-12-30 NOTE — CM/SW NOTE
MD orders received regarding HHC. Susan with [facility] is aware of referral and orders. Plan is for discharge later today 12/30.     WC documentation is still pending from MD.      MD will need to document the following in progress note for zelalem

## 2019-12-30 NOTE — DISCHARGE SUMMARY
[facility]    Discharge Summary    Sarina Ba Patient Status:  Inpatient    1950 MRN [id number]   Location [facility] 4W/SW/SE Attending Sofie Fair MD   Hosp Day # 6 PCP Raysa Rosenthal MD, Musa Mcclain MD     Date of  Drive HCl 100 MG Oral Tab  Take 1 tablet (100 mg total) by mouth 3 (three) times daily. Discharge Diet: As tolerated    Discharge Activity: As tolerated    Follow up:              JAYME YOON [facility] MD, Eugene Hood  12/30/2019

## 2019-12-30 NOTE — PROGRESS NOTES
[facility] ID PROGRESS NOTE    Jonah Clark Patient Status:  Inpatient    1950 MRN [id number]   Location [facility] 4W/SW/SE Attending Yolis Martin MD   Hosp Day # 6 PCP Tracey Perry MD, Gonzalo Mosley MD     Subjective:  Awake, plans for DC likely a surgery in the right foot. Patient initially trauma to the right foot when he bumped it on his walker about 6 weeks ago.   Was admitted on Thanksgiving and underwent lower extremity angiogram for right fifth toe gangrene with findings of right PTA occlusio

## 2019-12-31 NOTE — CM/SW NOTE
CM paged Dr. Nicky Valdez regarding [facility] documentation and signed order from [facility]. Per Dr. Nicky Valdez [facility] from has been signed and faxed to [facility]. WC documentation not completed at this time, he states he will do it as soon as possible.   I also made Dr. Nicky Valdez a

## 2019-12-31 NOTE — PLAN OF CARE
Problem: Patient Centered Care  Goal: Patient preferences are identified and integrated in the patient's plan of care  Description  Interventions:  - Provide timely, complete, and accurate information to patient/family  - Incorporate patient and family k deficits and behaviors that affect risk of falls.   - [place] fall precautions as indicated by assessment.  - Educate pt/family on patient safety including physical limitations  - Instruct pt to call for assistance with activity based on assessment  - Mod weights  Outcome: Progressing  Goal: Absence of cardiac arrhythmias or at baseline  Description  INTERVENTIONS:  - Continuous cardiac monitoring, monitor vital signs, obtain 12 lead EKG if indicated  - Evaluate effectiveness of antiarrhythmic and heart rat Progressing     Problem: SKIN/TISSUE INTEGRITY - ADULT  Goal: Skin integrity remains intact  Description  INTERVENTIONS  - Assess and document risk factors for pressure ulcer development  - Assess and document skin integrity  - Monitor for areas of redness Progressing     Problem: Impaired Functional Mobility  Goal: Achieve highest/safest level of mobility/gait  Description  Interventions:  - Assess patient's functional ability and stability  - Promote increasing activity/tolerance for mobility and gait  - E

## 2020-01-02 NOTE — CM/SW NOTE
SUSAN received a call from Tyrell de la cruz at [facility] who states [facility] documentation from Dr. Kim Garcia was not completed. SUSAN followed up and attempted to call Dr. Aditya Sweeney office at [phone]. There was no answer nor was SUSAN able to leave a message for a call back.  SUSAN will

## 2020-02-10 ENCOUNTER — HOSPITAL ENCOUNTER (INPATIENT)
Facility: HOSPITAL | Age: 70
LOS: 4 days | Discharge: NURSING, REHAB OR LONG-TERM CARE | DRG: 239 | End: 2020-02-15
Attending: EMERGENCY MEDICINE | Admitting: INTERNAL MEDICINE
Payer: MEDICARE

## 2020-02-10 DIAGNOSIS — L03.115 CELLULITIS OF RIGHT LOWER EXTREMITY: Primary | ICD-10-CM

## 2020-02-10 LAB
ANION GAP SERPL CALC-SCNC: 7 MMOL/L (ref 0–18)
BASOPHILS # BLD AUTO: 0.06 X10(3) UL (ref 0–0.2)
BASOPHILS NFR BLD AUTO: 0.5 %
BUN BLD-MCNC: 41 MG/DL (ref 7–18)
BUN/CREAT SERPL: 11.4 (ref 10–20)
CALCIUM BLD-MCNC: 8.7 MG/DL (ref 8.5–10.1)
CHLORIDE SERPL-SCNC: 94 MMOL/L (ref 98–112)
CO2 SERPL-SCNC: 33 MMOL/L (ref 21–32)
CREAT BLD-MCNC: 3.59 MG/DL (ref 0.7–1.3)
DEPRECATED RDW RBC AUTO: 49.7 FL (ref 35.1–46.3)
EOSINOPHIL # BLD AUTO: 0.18 X10(3) UL (ref 0–0.7)
EOSINOPHIL NFR BLD AUTO: 1.4 %
ERYTHROCYTE [DISTWIDTH] IN BLOOD BY AUTOMATED COUNT: 15.6 % (ref 11–15)
GLUCOSE BLD-MCNC: 227 MG/DL (ref 70–99)
HCT VFR BLD AUTO: 28.3 % (ref 39–53)
HGB BLD-MCNC: 9.1 G/DL (ref 13–17.5)
IMM GRANULOCYTES # BLD AUTO: 0.06 X10(3) UL (ref 0–1)
IMM GRANULOCYTES NFR BLD: 0.5 %
INR BLD: 2.91 (ref 0.9–1.2)
LYMPHOCYTES # BLD AUTO: 0.61 X10(3) UL (ref 1–4)
LYMPHOCYTES NFR BLD AUTO: 4.8 %
MCH RBC QN AUTO: 28.1 PG (ref 26–34)
MCHC RBC AUTO-ENTMCNC: 32.2 G/DL (ref 31–37)
MCV RBC AUTO: 87.3 FL (ref 80–100)
MONOCYTES # BLD AUTO: 0.64 X10(3) UL (ref 0.1–1)
MONOCYTES NFR BLD AUTO: 5 %
NEUTROPHILS # BLD AUTO: 11.15 X10 (3) UL (ref 1.5–7.7)
NEUTROPHILS # BLD AUTO: 11.15 X10(3) UL (ref 1.5–7.7)
NEUTROPHILS NFR BLD AUTO: 87.8 %
OSMOLALITY SERPL CALC.SUM OF ELEC: 295 MOSM/KG (ref 275–295)
PLATELET # BLD AUTO: 360 10(3)UL (ref 150–450)
POTASSIUM SERPL-SCNC: 4.5 MMOL/L (ref 3.5–5.1)
PROTHROMBIN TIME: 31 SECONDS (ref 11.8–14.5)
RBC # BLD AUTO: 3.24 X10(6)UL (ref 3.8–5.8)
SODIUM SERPL-SCNC: 134 MMOL/L (ref 136–145)
WBC # BLD AUTO: 12.7 X10(3) UL (ref 4–11)

## 2020-02-10 PROCEDURE — 87070 CULTURE OTHR SPECIMN AEROBIC: CPT | Performed by: EMERGENCY MEDICINE

## 2020-02-10 PROCEDURE — 87186 SC STD MICRODIL/AGAR DIL: CPT | Performed by: EMERGENCY MEDICINE

## 2020-02-10 PROCEDURE — 96365 THER/PROPH/DIAG IV INF INIT: CPT

## 2020-02-10 PROCEDURE — 85610 PROTHROMBIN TIME: CPT | Performed by: EMERGENCY MEDICINE

## 2020-02-10 PROCEDURE — 87077 CULTURE AEROBIC IDENTIFY: CPT | Performed by: EMERGENCY MEDICINE

## 2020-02-10 PROCEDURE — 87205 SMEAR GRAM STAIN: CPT | Performed by: EMERGENCY MEDICINE

## 2020-02-10 PROCEDURE — 99285 EMERGENCY DEPT VISIT HI MDM: CPT

## 2020-02-10 PROCEDURE — 85025 COMPLETE CBC W/AUTO DIFF WBC: CPT | Performed by: EMERGENCY MEDICINE

## 2020-02-10 PROCEDURE — 80048 BASIC METABOLIC PNL TOTAL CA: CPT | Performed by: EMERGENCY MEDICINE

## 2020-02-10 PROCEDURE — 96367 TX/PROPH/DG ADDL SEQ IV INF: CPT

## 2020-02-10 RX ORDER — ASPIRIN 81 MG/1
81 TABLET ORAL DAILY
Status: DISCONTINUED | OUTPATIENT
Start: 2020-02-10 | End: 2020-02-11 | Stop reason: SDUPTHER

## 2020-02-10 RX ORDER — HYDRALAZINE HYDROCHLORIDE 50 MG/1
100 TABLET, FILM COATED ORAL 3 TIMES DAILY
Status: DISCONTINUED | OUTPATIENT
Start: 2020-02-10 | End: 2020-02-15

## 2020-02-10 RX ORDER — CARVEDILOL 6.25 MG/1
6.25 TABLET ORAL 2 TIMES DAILY WITH MEALS
Status: DISCONTINUED | OUTPATIENT
Start: 2020-02-10 | End: 2020-02-15

## 2020-02-10 RX ORDER — HYDROCODONE BITARTRATE AND ACETAMINOPHEN 10; 325 MG/1; MG/1
1 TABLET ORAL EVERY 4 HOURS PRN
Status: DISCONTINUED | OUTPATIENT
Start: 2020-02-10 | End: 2020-02-15

## 2020-02-10 RX ORDER — ATORVASTATIN CALCIUM 40 MG/1
80 TABLET, FILM COATED ORAL NIGHTLY
Status: DISCONTINUED | OUTPATIENT
Start: 2020-02-10 | End: 2020-02-15

## 2020-02-10 RX ORDER — WARFARIN SODIUM 3 MG/1
1.5 TABLET ORAL NIGHTLY
Status: DISCONTINUED | OUTPATIENT
Start: 2020-02-10 | End: 2020-02-11 | Stop reason: SDUPTHER

## 2020-02-10 RX ORDER — FUROSEMIDE 40 MG/1
40 TABLET ORAL DAILY
Status: DISCONTINUED | OUTPATIENT
Start: 2020-02-10 | End: 2020-02-15

## 2020-02-10 RX ORDER — ISOSORBIDE MONONITRATE 60 MG/1
60 TABLET, EXTENDED RELEASE ORAL DAILY
Status: DISCONTINUED | OUTPATIENT
Start: 2020-02-11 | End: 2020-02-15

## 2020-02-10 RX ORDER — VANCOMYCIN/0.9 % SOD CHLORIDE 1.75 G/5
25 PLASTIC BAG, INJECTION (ML) INTRAVENOUS ONCE
Status: COMPLETED | OUTPATIENT
Start: 2020-02-10 | End: 2020-02-11

## 2020-02-10 RX ORDER — AMIODARONE HYDROCHLORIDE 200 MG/1
100 TABLET ORAL NIGHTLY
Status: DISCONTINUED | OUTPATIENT
Start: 2020-02-10 | End: 2020-02-15

## 2020-02-10 RX ORDER — SPIRONOLACTONE 25 MG/1
25 TABLET ORAL DAILY
Status: DISCONTINUED | OUTPATIENT
Start: 2020-02-10 | End: 2020-02-11 | Stop reason: SDUPTHER

## 2020-02-11 ENCOUNTER — APPOINTMENT (OUTPATIENT)
Dept: GENERAL RADIOLOGY | Facility: HOSPITAL | Age: 70
DRG: 239 | End: 2020-02-11
Attending: INTERNAL MEDICINE
Payer: MEDICARE

## 2020-02-11 LAB
ANION GAP SERPL CALC-SCNC: 6 MMOL/L (ref 0–18)
BUN BLD-MCNC: 40 MG/DL (ref 7–18)
BUN/CREAT SERPL: 11.3 (ref 10–20)
CALCIUM BLD-MCNC: 8.2 MG/DL (ref 8.5–10.1)
CHLORIDE SERPL-SCNC: 97 MMOL/L (ref 98–112)
CO2 SERPL-SCNC: 32 MMOL/L (ref 21–32)
CREAT BLD-MCNC: 3.53 MG/DL (ref 0.7–1.3)
DEPRECATED RDW RBC AUTO: 49.8 FL (ref 35.1–46.3)
ERYTHROCYTE [DISTWIDTH] IN BLOOD BY AUTOMATED COUNT: 15.6 % (ref 11–15)
GLUCOSE BLD-MCNC: 144 MG/DL (ref 70–99)
GLUCOSE BLDC GLUCOMTR-MCNC: 139 MG/DL (ref 70–99)
GLUCOSE BLDC GLUCOMTR-MCNC: 148 MG/DL (ref 70–99)
GLUCOSE BLDC GLUCOMTR-MCNC: 227 MG/DL (ref 70–99)
GLUCOSE BLDC GLUCOMTR-MCNC: 264 MG/DL (ref 70–99)
GLUCOSE BLDC GLUCOMTR-MCNC: 267 MG/DL (ref 70–99)
HCT VFR BLD AUTO: 24 % (ref 39–53)
HGB BLD-MCNC: 7.7 G/DL (ref 13–17.5)
MCH RBC QN AUTO: 28 PG (ref 26–34)
MCHC RBC AUTO-ENTMCNC: 32.1 G/DL (ref 31–37)
MCV RBC AUTO: 87.3 FL (ref 80–100)
OSMOLALITY SERPL CALC.SUM OF ELEC: 292 MOSM/KG (ref 275–295)
PLATELET # BLD AUTO: 284 10(3)UL (ref 150–450)
POTASSIUM SERPL-SCNC: 3.7 MMOL/L (ref 3.5–5.1)
RBC # BLD AUTO: 2.75 X10(6)UL (ref 3.8–5.8)
SODIUM SERPL-SCNC: 135 MMOL/L (ref 136–145)
WBC # BLD AUTO: 7.8 X10(3) UL (ref 4–11)

## 2020-02-11 PROCEDURE — 5A1D70Z PERFORMANCE OF URINARY FILTRATION, INTERMITTENT, LESS THAN 6 HOURS PER DAY: ICD-10-PCS | Performed by: INTERNAL MEDICINE

## 2020-02-11 PROCEDURE — 85027 COMPLETE CBC AUTOMATED: CPT | Performed by: INTERNAL MEDICINE

## 2020-02-11 PROCEDURE — 73630 X-RAY EXAM OF FOOT: CPT | Performed by: INTERNAL MEDICINE

## 2020-02-11 PROCEDURE — 82962 GLUCOSE BLOOD TEST: CPT

## 2020-02-11 PROCEDURE — 90935 HEMODIALYSIS ONE EVALUATION: CPT

## 2020-02-11 PROCEDURE — 80048 BASIC METABOLIC PNL TOTAL CA: CPT | Performed by: INTERNAL MEDICINE

## 2020-02-11 RX ORDER — ASPIRIN 81 MG/1
81 TABLET, CHEWABLE ORAL DAILY
Status: DISCONTINUED | OUTPATIENT
Start: 2020-02-11 | End: 2020-02-15

## 2020-02-11 RX ORDER — HEPARIN SODIUM 1000 [USP'U]/ML
1.5 INJECTION, SOLUTION INTRAVENOUS; SUBCUTANEOUS
Status: DISCONTINUED | OUTPATIENT
Start: 2020-02-11 | End: 2020-02-14

## 2020-02-11 RX ORDER — HEPARIN SODIUM 1000 [USP'U]/ML
1.5 INJECTION, SOLUTION INTRAVENOUS; SUBCUTANEOUS
Status: DISCONTINUED | OUTPATIENT
Start: 2020-02-12 | End: 2020-02-14

## 2020-02-11 RX ORDER — GABAPENTIN 100 MG/1
100 CAPSULE ORAL NIGHTLY
COMMUNITY

## 2020-02-11 NOTE — ED PROVIDER NOTES
Patient Seen in: [facility] AND [facility] Emergency Department    History   Patient presents with:  Musculoskeletal Problem    Stated Complaint: Leg weakness/ bilateral x 3 hours    HPI    Presents because he was weak this evening for the past 3 hours.   He was comment: quit over 25 years ago    Alcohol use: Not Currently      Frequency: Monthly or less    Drug use: Never        Medications :   Warfarin Sodium 1 MG Oral Tab,  Take 1.5 mg by mouth nightly.    HYDROcodone-acetaminophen  MG Oral Tab,  Take 1 ta icterus. Eyelids appear normal, no lesions. Cardiovascular:  Normal S1 and S2, no murmur, regular, with good peripheral perfusion. Respiratory:  Lungs clear to auscultation bilaterally with good effort. No wheezes, ronchi, or rales.   Abdomen:  Soft, no GFR, Non- 16 (*)     GFR, -American 19 (*)     All other components within normal limits   PROTHROMBIN TIME (PT) - Abnormal; Notable for the following components:    PT 31.0 (*)     INR 2.91 (*)     All other components within no

## 2020-02-11 NOTE — PROGRESS NOTES
[facility] Dosing Service    Initial Pharmacokinetic Consult for Vancomycin Dosing     Lolis Peters is a 71year old male who is being treated for cellulitis.   Pharmacy has been asked to dose Vancomycin by ARIANE Tobar     He is allergic to metformin

## 2020-02-11 NOTE — DIETARY NOTE
ADULT NUTRITION INITIAL ASSESSMENT    Pt is at moderate nutrition risk. Pt does not meet malnutrition criteria.      RECOMMENDATIONS TO MD:  None at this time     NUTRITION DIAGNOSIS/PROBLEM:  Increased nutrient needs, especially protein related to increas 74.3 kg (163 lb 12.8 oz)   BMI: Body mass index is 26.44 kg/m².   BMI CLASSIFICATION: 25-29.9 kg/m2 - overweight  IBW: 142 lbs        115% IBW  Usual Body Wt: 160 lbs est dry weight       102% UBW this am prior to dialysis  WEIGHT HISTORY:  Patient Weight(s reviewed.     NUTRITION PRESCRIPTION:  Diet: Cardiac and Renal  Oral Supplements: none/declined  ESTIMATED NUTRITION NEEDS:  Calories: 1344-3020 calories/day (25-28 calories per kg Current wt)  Protein:  grams protein/day (1.2-1.5 grams protein per kg

## 2020-02-11 NOTE — CONSULTS
[facility]    Report of Consultation    [facility] Patient Status:  Inpatient    1950 MRN [id number]   Location [facility] 3W/SW Attending Susan Winn MD   Hosp Day # 0 PCP Jose Garcia MD, Oumar Sinha MD     Date of N/A 7/29/2019    Performed by Wendy Chong MD at [address] [place] [address]   • TOE AMPUTATION Right 12/24/2019    Performed by Niall Aparicio MD at [address] [place] [address] OR   • TOE AMPUTATION Right 11/26/2019    Performed by Rubén Garcia MD at [facility] 12/23/2019 at 1500  HYDROcodone-acetaminophen  MG Oral Tab, Take 1 tablet by mouth every 4 (four) hours as needed. , Disp: 25 tablet, Rfl: 0, 12/23/2019 at 1000  atorvastatin 80 MG Oral Tab, Take 80 mg by mouth nightly., Disp: , Rfl: , 12/22/2019  car Minimal edema. Good thrill in left upper arm av fistula  Lower Extremities:Good pulses. Minimal edema. Gangrene tip right 2nd and 3rd toes and ulcer right first MTH medial. Good granulation lateral right forefoot. Weak right PT pulse cap refill ok.     Skin Right distal forefoot ischemia but good granulation right lateral foot wound. Reasonable to try right open transmet amputation. Plan for tomorrow 5 pm. Patient agrees.     Dmitri Bales MD  2/11/2020  11:12 AM

## 2020-02-11 NOTE — PROGRESS NOTES
[facility] Pharmacy Note: Antimicrobial Weight Based Dose Adjustment for: vancomycin (Sarah Lakhwinder)    Sohail Triplett is a 71year old male who has been prescribed vancomycin (VANCOCIN) 1000 mg x1 in ER.     Estimated Creatinine Clearance: 17.5 mL/min (A) (based on

## 2020-02-11 NOTE — CONSULTS
[place] [facility] ID CONSULT NOTE    Cheral  Patient Status:  Inpatient    1950 MRN [id number]   Location [facility] 3W/SW Attending Cheri Guerin MD   Hosp Day # 0 PCP Dilshad Abdi MD, Wayne Fischer MD       Reason f Performed by Sohail Yang MD at [facility]   • CABG  09/2015   • COLONOSCOPY N/A 7/29/2019    Performed by Roel Phelan MD at [facility] ENDOSCOPY   • ESOPHAGOGASTRODUODENOSCOPY (EGD) N/A 7/29/2019    Performed by Roel Phelan MD at [facility] ENDOSCOPY   • TOE •  Warfarin Sodium (COUMADIN) tab 1.5 mg, 1.5 mg, Oral, Nightly    Review of Systems:  CONSTITUTIONAL:  No weight loss, weakness or fatigue. HEENT:  Eyes:  No visual loss, blurred vision, double vision or yellow sclerae.  Ears, Nose, Throat:  No hearing lo Microbiology: Reviewed in EMR    Radiology: Reviewed    ASSESSMENT:    Antibiotics: Vancomycin, zosyn    71year old male with a history of diabetes, ESRD on HD via right chest tunneled HD catheter as well as a left upper extremity AV fistula, PAD with pre -  Reviewed labs, micro, imaging reports, available old records. -  Case d/w patient, RN. Thank you for allowing us to participate in the care of this patient. Please do not hesitate to call if you have any questions.    We will continue to follow with

## 2020-02-11 NOTE — PROGRESS NOTES
[facility]    Progress Note    [facility] Patient Status:  Inpatient    1950 MRN [id number]   Location [facility] 3W/SW Attending Sriram Yanes MD   Hosp Day # 0 PCP Cornelia Sellers MD, Dannie Abad MD        Subjective: ALKPHO 83 12/27/2019    BILT 0.3 12/27/2019    TP 5.3 (L) 12/27/2019    AST 11 (L) 12/27/2019    ALT <6 (L) 12/27/2019    PTT 40.2 (H) 07/25/2019    INR 2.91 (H) 02/10/2020    TSH 1.530 07/25/2019    ESRML 88 (H) 11/25/2019    CRP 3.76 (H) 11/25/2019    PH

## 2020-02-11 NOTE — ED INITIAL ASSESSMENT (HPI)
Pt brought in by EMS for bilateral pain and weakness started x 3 hours ago, to the point pt cant walk. Pt is Dialysis pt, had CVA, Diabetes. Pt is A/O x 3, breathing non labored. Last dialysis was Saturday last week.   IV established to the right AC by

## 2020-02-11 NOTE — CM/SW NOTE
Received notice from [facility] that pt is current w/ their services. Need resume [place] orders prior to d/c. PLAN: Home w/ [facility], need resume [place] orders    SW/CM to remain available for support and/or discharge planning.        Cornelio Anderson, [facility]

## 2020-02-11 NOTE — ED NOTES
Pt transported to Room 337 A by stretcher. Pt is stable at this time, no complaints at this time. Pt has Vancomycin infusing to the right AC by IV pump.

## 2020-02-11 NOTE — CONSULTS
[facility]    Report of Consultation    Caleb Javier Patient Status:  Inpatient    1950 MRN [id number]   Location [facility] 3W/SW Attending Viry Reyes MD   Hosp Day # 0 PCP Radha Fisher MD, Margie Pablo MD     Date of A History  Social History    Tobacco Use      Smoking status: Former Smoker      Smokeless tobacco: Never Used      Tobacco comment: quit over 25 years ago    Alcohol use: Not Currently      Frequency: Monthly or less    Drug use: Never      Current Kinsey Blocker MG Oral Tablet 24 Hr, Take 1 tablet (60 mg total) by mouth daily. Allergies    Metformin               PAIN    Comment:Terrible upset stomach    Review of Systems:     General: weak       A comprehensive 12 point review of systems was completed.   Pe

## 2020-02-12 ENCOUNTER — ANESTHESIA EVENT (OUTPATIENT)
Dept: SURGERY | Facility: HOSPITAL | Age: 70
DRG: 239 | End: 2020-02-12
Payer: MEDICARE

## 2020-02-12 ENCOUNTER — ANESTHESIA (OUTPATIENT)
Dept: SURGERY | Facility: HOSPITAL | Age: 70
DRG: 239 | End: 2020-02-12
Payer: MEDICARE

## 2020-02-12 LAB
ALBUMIN SERPL-MCNC: 2 G/DL (ref 3.4–5)
ANION GAP SERPL CALC-SCNC: 3 MMOL/L (ref 0–18)
BASOPHILS # BLD AUTO: 0.05 X10(3) UL (ref 0–0.2)
BASOPHILS NFR BLD AUTO: 0.6 %
BUN BLD-MCNC: 21 MG/DL (ref 7–18)
BUN/CREAT SERPL: 8.9 (ref 10–20)
CALCIUM BLD-MCNC: 8.3 MG/DL (ref 8.5–10.1)
CHLORIDE SERPL-SCNC: 102 MMOL/L (ref 98–112)
CO2 SERPL-SCNC: 33 MMOL/L (ref 21–32)
CREAT BLD-MCNC: 2.36 MG/DL (ref 0.7–1.3)
DEPRECATED HBV CORE AB SER IA-ACNC: 988.3 NG/ML (ref 30–530)
DEPRECATED RDW RBC AUTO: 50.1 FL (ref 35.1–46.3)
EOSINOPHIL # BLD AUTO: 0.27 X10(3) UL (ref 0–0.7)
EOSINOPHIL NFR BLD AUTO: 3.4 %
ERYTHROCYTE [DISTWIDTH] IN BLOOD BY AUTOMATED COUNT: 15.6 % (ref 11–15)
GLUCOSE BLD-MCNC: 176 MG/DL (ref 70–99)
GLUCOSE BLDC GLUCOMTR-MCNC: 128 MG/DL (ref 70–99)
GLUCOSE BLDC GLUCOMTR-MCNC: 132 MG/DL (ref 70–99)
GLUCOSE BLDC GLUCOMTR-MCNC: 139 MG/DL (ref 70–99)
GLUCOSE BLDC GLUCOMTR-MCNC: 145 MG/DL (ref 70–99)
GLUCOSE BLDC GLUCOMTR-MCNC: 180 MG/DL (ref 70–99)
HCT VFR BLD AUTO: 25.6 % (ref 39–53)
HGB BLD-MCNC: 7.9 G/DL (ref 13–17.5)
IMM GRANULOCYTES # BLD AUTO: 0.04 X10(3) UL (ref 0–1)
IMM GRANULOCYTES NFR BLD: 0.5 %
IRON SATURATION: 18 % (ref 20–50)
IRON SERPL-MCNC: 29 UG/DL (ref 65–175)
LYMPHOCYTES # BLD AUTO: 0.78 X10(3) UL (ref 1–4)
LYMPHOCYTES NFR BLD AUTO: 9.8 %
MCH RBC QN AUTO: 27 PG (ref 26–34)
MCHC RBC AUTO-ENTMCNC: 30.9 G/DL (ref 31–37)
MCV RBC AUTO: 87.4 FL (ref 80–100)
MONOCYTES # BLD AUTO: 0.57 X10(3) UL (ref 0.1–1)
MONOCYTES NFR BLD AUTO: 7.1 %
NEUTROPHILS # BLD AUTO: 6.27 X10 (3) UL (ref 1.5–7.7)
NEUTROPHILS # BLD AUTO: 6.27 X10(3) UL (ref 1.5–7.7)
NEUTROPHILS NFR BLD AUTO: 78.6 %
OSMOLALITY SERPL CALC.SUM OF ELEC: 293 MOSM/KG (ref 275–295)
PHOSPHATE SERPL-MCNC: 2.6 MG/DL (ref 2.5–4.9)
PLATELET # BLD AUTO: 294 10(3)UL (ref 150–450)
POTASSIUM SERPL-SCNC: 3.7 MMOL/L (ref 3.5–5.1)
RBC # BLD AUTO: 2.93 X10(6)UL (ref 3.8–5.8)
SODIUM SERPL-SCNC: 138 MMOL/L (ref 136–145)
TOTAL IRON BINDING CAPACITY: 161 UG/DL (ref 240–450)
TRANSFERRIN SERPL-MCNC: 108 MG/DL (ref 200–360)
WBC # BLD AUTO: 8 X10(3) UL (ref 4–11)

## 2020-02-12 PROCEDURE — 87076 CULTURE ANAEROBE IDENT EACH: CPT | Performed by: SURGERY

## 2020-02-12 PROCEDURE — 88311 DECALCIFY TISSUE: CPT | Performed by: SURGERY

## 2020-02-12 PROCEDURE — 82962 GLUCOSE BLOOD TEST: CPT

## 2020-02-12 PROCEDURE — 88307 TISSUE EXAM BY PATHOLOGIST: CPT | Performed by: SURGERY

## 2020-02-12 PROCEDURE — 0Y6M0ZB DETACHMENT AT RIGHT FOOT, PARTIAL 2ND RAY, OPEN APPROACH: ICD-10-PCS | Performed by: SURGERY

## 2020-02-12 PROCEDURE — 82728 ASSAY OF FERRITIN: CPT | Performed by: INTERNAL MEDICINE

## 2020-02-12 PROCEDURE — 0Y6M0Z9 DETACHMENT AT RIGHT FOOT, PARTIAL 1ST RAY, OPEN APPROACH: ICD-10-PCS | Performed by: SURGERY

## 2020-02-12 PROCEDURE — 87205 SMEAR GRAM STAIN: CPT | Performed by: SURGERY

## 2020-02-12 PROCEDURE — 80069 RENAL FUNCTION PANEL: CPT | Performed by: INTERNAL MEDICINE

## 2020-02-12 PROCEDURE — 0Y6M0ZC DETACHMENT AT RIGHT FOOT, PARTIAL 3RD RAY, OPEN APPROACH: ICD-10-PCS | Performed by: SURGERY

## 2020-02-12 PROCEDURE — 0HBMXZZ EXCISION OF RIGHT FOOT SKIN, EXTERNAL APPROACH: ICD-10-PCS | Performed by: SURGERY

## 2020-02-12 PROCEDURE — 87070 CULTURE OTHR SPECIMN AEROBIC: CPT | Performed by: SURGERY

## 2020-02-12 PROCEDURE — 87075 CULTR BACTERIA EXCEPT BLOOD: CPT | Performed by: SURGERY

## 2020-02-12 PROCEDURE — 87077 CULTURE AEROBIC IDENTIFY: CPT | Performed by: SURGERY

## 2020-02-12 PROCEDURE — 88305 TISSUE EXAM BY PATHOLOGIST: CPT | Performed by: SURGERY

## 2020-02-12 PROCEDURE — 85025 COMPLETE CBC W/AUTO DIFF WBC: CPT | Performed by: INTERNAL MEDICINE

## 2020-02-12 PROCEDURE — 84466 ASSAY OF TRANSFERRIN: CPT | Performed by: INTERNAL MEDICINE

## 2020-02-12 PROCEDURE — 83540 ASSAY OF IRON: CPT | Performed by: INTERNAL MEDICINE

## 2020-02-12 RX ORDER — LIDOCAINE HYDROCHLORIDE 10 MG/ML
INJECTION, SOLUTION EPIDURAL; INFILTRATION; INTRACAUDAL; PERINEURAL AS NEEDED
Status: DISCONTINUED | OUTPATIENT
Start: 2020-02-12 | End: 2020-02-12 | Stop reason: SURG

## 2020-02-12 RX ORDER — MORPHINE SULFATE 4 MG/ML
4 INJECTION, SOLUTION INTRAMUSCULAR; INTRAVENOUS EVERY 10 MIN PRN
Status: DISCONTINUED | OUTPATIENT
Start: 2020-02-12 | End: 2020-02-12 | Stop reason: HOSPADM

## 2020-02-12 RX ORDER — NALOXONE HYDROCHLORIDE 0.4 MG/ML
80 INJECTION, SOLUTION INTRAMUSCULAR; INTRAVENOUS; SUBCUTANEOUS AS NEEDED
Status: DISCONTINUED | OUTPATIENT
Start: 2020-02-12 | End: 2020-02-12 | Stop reason: HOSPADM

## 2020-02-12 RX ORDER — MORPHINE SULFATE 10 MG/ML
6 INJECTION, SOLUTION INTRAMUSCULAR; INTRAVENOUS EVERY 10 MIN PRN
Status: DISCONTINUED | OUTPATIENT
Start: 2020-02-12 | End: 2020-02-12 | Stop reason: HOSPADM

## 2020-02-12 RX ORDER — HYDROCODONE BITARTRATE AND ACETAMINOPHEN 5; 325 MG/1; MG/1
1 TABLET ORAL AS NEEDED
Status: DISCONTINUED | OUTPATIENT
Start: 2020-02-12 | End: 2020-02-12 | Stop reason: HOSPADM

## 2020-02-12 RX ORDER — DEXTROSE MONOHYDRATE 25 G/50ML
50 INJECTION, SOLUTION INTRAVENOUS
Status: DISCONTINUED | OUTPATIENT
Start: 2020-02-12 | End: 2020-02-12 | Stop reason: HOSPADM

## 2020-02-12 RX ORDER — ONDANSETRON 2 MG/ML
4 INJECTION INTRAMUSCULAR; INTRAVENOUS ONCE AS NEEDED
Status: DISCONTINUED | OUTPATIENT
Start: 2020-02-12 | End: 2020-02-12 | Stop reason: HOSPADM

## 2020-02-12 RX ORDER — ONDANSETRON 2 MG/ML
INJECTION INTRAMUSCULAR; INTRAVENOUS AS NEEDED
Status: DISCONTINUED | OUTPATIENT
Start: 2020-02-12 | End: 2020-02-12 | Stop reason: SURG

## 2020-02-12 RX ORDER — HYDROCODONE BITARTRATE AND ACETAMINOPHEN 5; 325 MG/1; MG/1
2 TABLET ORAL AS NEEDED
Status: DISCONTINUED | OUTPATIENT
Start: 2020-02-12 | End: 2020-02-12 | Stop reason: HOSPADM

## 2020-02-12 RX ORDER — HALOPERIDOL 5 MG/ML
0.25 INJECTION INTRAMUSCULAR ONCE AS NEEDED
Status: DISCONTINUED | OUTPATIENT
Start: 2020-02-12 | End: 2020-02-12 | Stop reason: HOSPADM

## 2020-02-12 RX ORDER — HYDROMORPHONE HYDROCHLORIDE 1 MG/ML
0.4 INJECTION, SOLUTION INTRAMUSCULAR; INTRAVENOUS; SUBCUTANEOUS EVERY 5 MIN PRN
Status: DISCONTINUED | OUTPATIENT
Start: 2020-02-12 | End: 2020-02-12 | Stop reason: HOSPADM

## 2020-02-12 RX ORDER — HEPARIN SODIUM 1000 [USP'U]/ML
1.5 INJECTION, SOLUTION INTRAVENOUS; SUBCUTANEOUS
Status: DISCONTINUED | OUTPATIENT
Start: 2020-02-14 | End: 2020-02-15

## 2020-02-12 RX ORDER — HYDROMORPHONE HYDROCHLORIDE 1 MG/ML
0.6 INJECTION, SOLUTION INTRAMUSCULAR; INTRAVENOUS; SUBCUTANEOUS EVERY 5 MIN PRN
Status: DISCONTINUED | OUTPATIENT
Start: 2020-02-12 | End: 2020-02-12 | Stop reason: HOSPADM

## 2020-02-12 RX ORDER — MORPHINE SULFATE 4 MG/ML
2 INJECTION, SOLUTION INTRAMUSCULAR; INTRAVENOUS EVERY 10 MIN PRN
Status: DISCONTINUED | OUTPATIENT
Start: 2020-02-12 | End: 2020-02-12 | Stop reason: HOSPADM

## 2020-02-12 RX ORDER — HYDROCODONE BITARTRATE AND ACETAMINOPHEN 10; 325 MG/1; MG/1
1 TABLET ORAL EVERY 4 HOURS PRN
Status: DISCONTINUED | OUTPATIENT
Start: 2020-02-12 | End: 2020-02-15

## 2020-02-12 RX ORDER — SODIUM CHLORIDE 9 MG/ML
INJECTION, SOLUTION INTRAVENOUS ONCE
Status: COMPLETED | OUTPATIENT
Start: 2020-02-12 | End: 2020-02-12

## 2020-02-12 RX ORDER — METOPROLOL TARTRATE 5 MG/5ML
2.5 INJECTION INTRAVENOUS ONCE
Status: DISCONTINUED | OUTPATIENT
Start: 2020-02-12 | End: 2020-02-12 | Stop reason: HOSPADM

## 2020-02-12 RX ORDER — HYDROMORPHONE HYDROCHLORIDE 1 MG/ML
0.2 INJECTION, SOLUTION INTRAMUSCULAR; INTRAVENOUS; SUBCUTANEOUS EVERY 5 MIN PRN
Status: DISCONTINUED | OUTPATIENT
Start: 2020-02-12 | End: 2020-02-12 | Stop reason: HOSPADM

## 2020-02-12 RX ORDER — SODIUM CHLORIDE 9 MG/ML
INJECTION, SOLUTION INTRAVENOUS CONTINUOUS PRN
Status: DISCONTINUED | OUTPATIENT
Start: 2020-02-12 | End: 2020-02-12 | Stop reason: SURG

## 2020-02-12 RX ORDER — SODIUM CHLORIDE, SODIUM LACTATE, POTASSIUM CHLORIDE, CALCIUM CHLORIDE 600; 310; 30; 20 MG/100ML; MG/100ML; MG/100ML; MG/100ML
INJECTION, SOLUTION INTRAVENOUS CONTINUOUS
Status: DISCONTINUED | OUTPATIENT
Start: 2020-02-12 | End: 2020-02-12 | Stop reason: HOSPADM

## 2020-02-12 RX ORDER — PROCHLORPERAZINE EDISYLATE 5 MG/ML
5 INJECTION INTRAMUSCULAR; INTRAVENOUS ONCE AS NEEDED
Status: DISCONTINUED | OUTPATIENT
Start: 2020-02-12 | End: 2020-02-12 | Stop reason: HOSPADM

## 2020-02-12 RX ADMIN — SODIUM CHLORIDE: 9 INJECTION, SOLUTION INTRAVENOUS at 14:50:00

## 2020-02-12 RX ADMIN — ONDANSETRON 4 MG: 2 INJECTION INTRAMUSCULAR; INTRAVENOUS at 14:00:00

## 2020-02-12 RX ADMIN — LIDOCAINE HYDROCHLORIDE 30 MG: 10 INJECTION, SOLUTION EPIDURAL; INFILTRATION; INTRACAUDAL; PERINEURAL at 13:54:00

## 2020-02-12 RX ADMIN — SODIUM CHLORIDE: 9 INJECTION, SOLUTION INTRAVENOUS at 13:52:00

## 2020-02-12 RX ADMIN — LIDOCAINE HYDROCHLORIDE 20 MG: 10 INJECTION, SOLUTION EPIDURAL; INFILTRATION; INTRACAUDAL; PERINEURAL at 13:55:00

## 2020-02-12 NOTE — RESPIRATORY THERAPY NOTE
RC ASSESSMENT:    Pt does not have a previous diagnosis of RC. Pt does not routinely use a CPAP device at home.      CPAP INITIATION:    Pt to be placed on CPAP: no

## 2020-02-12 NOTE — PLAN OF CARE
Patient transferred from 3rd floor. A&Ox4. Up with assist with the walker. Christina CR. Dressing changed to wound on right foot. Plan for transmetatarsal amputation with Dr. Mark Jacob in morning. Patient NPO at 1AM in preparation.  Brayan called, and corey

## 2020-02-12 NOTE — BRIEF OP NOTE
[facility] POST ANESTHESIA CARE UNIT  Brief Op Note       Patients Name: Go Rosalesglen  Attending Physician: Romina Lucio MD  Operating Physician: Columba Daniel MD  CSN: [id number]     Location:  OR  MRN: [id number]    Date of Birth: [birth year]

## 2020-02-12 NOTE — PROGRESS NOTES
[facility]    Progress Note    Jing Stewart Patient Status:  Inpatient    1950 MRN [id number]   Location [facility] 4W/SW/SE Attending Myla Mcgee MD   Hosp Day # 1 PCP Jordy Reyes MD, Eugene Kent MD        Subjective TP 5.3 (L) 12/27/2019    AST 11 (L) 12/27/2019    ALT <6 (L) 12/27/2019    PTT 40.2 (H) 07/25/2019    INR 2.91 (H) 02/10/2020    TSH 1.530 07/25/2019    ESRML 88 (H) 11/25/2019    CRP 3.76 (H) 11/25/2019    PHOS 2.6 02/12/2020    B12 551 07/30/2019

## 2020-02-12 NOTE — PROGRESS NOTES
Omar Langston from dialysis reports /76 HR 52 after dialysis. 2.3L removed, charted in doc flow sheets.

## 2020-02-12 NOTE — PROGRESS NOTES
[facility] - [facility]    Progress Note    [facility] Patient Status:  Inpatient    1950 MRN [id number]   Location [facility] 4W/SW/SE Attending Sriram Yanes MD   Hosp Day # 1 PCP Cornelia Sellers MD, Dannie Abad MD       Subjective: Complete (min 3 Views), Right (cpt=73630)    Result Date: 2/11/2020  CONCLUSION:  1. There are postoperative changes noted from resections of a portion of the distal right 4th and 5th metatarsals.   However, there is new bone destruction and suggestion of o

## 2020-02-12 NOTE — PACU NOTE
Dr. Cadence Valdez contacted via phone by this RN about bleeding through surgical dressing, MD ordered to reinforce the dressing with an Ace bandage.  Will continue to monitor

## 2020-02-12 NOTE — ANESTHESIA POSTPROCEDURE EVALUATION
Patient: Christi Russell    Procedure Summary     Date:  02/12/20 Room / Location:  [address] [place] [address] MAIN OR 06 / [address] [place] [address] MAIN OR    Anesthesia Start:  7274 Anesthesia Stop:  0676    Procedure:  TOE AMPUTATION (Right Foot) Diagnosis:  (gangrene)    Surgeon:  Kia Hillman

## 2020-02-12 NOTE — ANESTHESIA PROCEDURE NOTES
Airway  Date/Time: 2/12/2020 1:57 PM  Urgency: Elective    Airway not difficult    General Information and Staff    Patient location during procedure: OR  Anesthesiologist: Mayte Fernández MD  Resident/CRNA: Ruth Quijano CRNA  Performed: CRNA     Indicat

## 2020-02-12 NOTE — OPERATIVE REPORT
[facility]    PATIENT'S NAME: Shannon WALLER   ATTENDING PHYSICIAN: Brittany Lopez MD   OPERATING PHYSICIAN: Leandro Diaz MD   PATIENT ACCOUNT#:   [de-identified]    LOCATION:  [facility] PACU [address] [place] [address]  MEDICAL RECORD #:   [id number]       DA that this may not heal and he may still go on and need a below-knee amputation but it was reasonable to proceed to try to salvage a walking platform with a transmetatarsal amputation.   This is also important because there was evidence of reasonable granula Kerlix. Sponge, needle, instrument counts were correct. Estimated blood loss was about 20 mL. No complications. The patient tolerated the procedure well, was being transported at the time of this dictation.     Dictated By Sal Dumont MD  d: 02/12/

## 2020-02-12 NOTE — PLAN OF CARE
DIALYSIS THIS MORNING, 2L REMOVED, CONSULT WITH DR. Riojas Friday, PLAN FOR R. TRANSMETATARSAL AMPUTATION TOMORROW AT 1700, PLAN FOR DIALYSIS AGAIN TOMORROW MORNING, PLAN TO TRANSFER TO GENERAL FLOOR THIS EVENING    Problem: Patient Centered Care  Goal: Patient pre

## 2020-02-12 NOTE — CM/SW NOTE
CM received phone call from [facility] stating that pt's son Amor Dukes would like to speak to CM/SW team regarding SNF options. PT/OT to scarlet MORRISON presented the SNF list with quality data to the pt's son.  Pt is agreeable to sub acute rehab at a skilled nursing

## 2020-02-12 NOTE — ANESTHESIA PREPROCEDURE EVALUATION
Anesthesia PreOp Note    HPI:     Vicky Carlos is a 71year old male who presents for preoperative consultation requested by: Charles Rey MD    Date of Surgery: 2/10/2020 - 2/12/2020    Procedure(s):  TOE AMPUTATION  Indication: gangrene    Relevan Performed by Lolis Nascimento MD at [address] [place] [address]    • A.V. FISTULA Left 7/26/2019    Performed by Lolis Nascimento MD at [address] [place] [address]    • CABG  09/2015   • COLONOSCOPY N/A 7/29/2019    Performed by Gabby Mejia MD at [facility] ENDOSCOPY   • [facility], Kelton Lima MD  Piperacillin Sod-Tazobactam So (ZOSYN) 3.375 g in dextrose 5 % 100 mL ADD-vantage, 3.375 g, Intravenous, Q12H, Dianne Corado PA-C, Last Rate: 25 mL/hr at 02/12/20 0039, 3.375 g at 02/12/20 0039  [MAR Hold] epoetin morris-epbx name: Not on file      Number of children: Not on file      Years of education: Not on file      Highest education level: Not on file    Occupational History      Not on file    Social Needs      Financial resource strain: Not on file      Food insecurity: PGLU 180 (H) 02/12/2020    CA 8.3 (L) 02/12/2020     Lab Results   Component Value Date    INR 2.91 (H) 02/10/2020       Vital Signs: Body mass index is 26.4 kg/m². height is 1.676 m (5' 6\") and weight is 74.2 kg (163 lb 9 oz).  His oral temperature

## 2020-02-12 NOTE — PROGRESS NOTES
[place] [facility]    Vicky Carlos Patient Status:  Inpatient    1950 MRN [id number]   Location [facility] 4W/SW/SE Attending Jayson Armstrong MD   Hosp Day # 1 PCP Hilda Mott MD, MD Donal Flores lower extremity stent with right PTA angioplasty 11/2019, s/p R fifth toe amputation 11/2019, HTN, HLD, CAD status post CABG, seen by our service during last admission 12/2019 for wet gangrene with R fourth and fifth TMA 12/24, path with clear margins, cx

## 2020-02-12 NOTE — CM/SW NOTE
The pt is home with with wife and [facility]. The pt. Also goes to dialysis at [facility] 150 MWF. The pt's wife does most of the driving to and from HD. Plan at this time is for hopeful home, resume HHC and HD.       [facility] ord

## 2020-02-13 LAB
ALBUMIN SERPL-MCNC: 1.9 G/DL (ref 3.4–5)
ANION GAP SERPL CALC-SCNC: 5 MMOL/L (ref 0–18)
BASOPHILS # BLD AUTO: 0.05 X10(3) UL (ref 0–0.2)
BASOPHILS NFR BLD AUTO: 0.5 %
BUN BLD-MCNC: 11 MG/DL (ref 7–18)
BUN/CREAT SERPL: 5.8 (ref 10–20)
CALCIUM BLD-MCNC: 8 MG/DL (ref 8.5–10.1)
CHLORIDE SERPL-SCNC: 102 MMOL/L (ref 98–112)
CO2 SERPL-SCNC: 31 MMOL/L (ref 21–32)
CREAT BLD-MCNC: 1.89 MG/DL (ref 0.7–1.3)
DEPRECATED RDW RBC AUTO: 51.6 FL (ref 35.1–46.3)
EOSINOPHIL # BLD AUTO: 0.3 X10(3) UL (ref 0–0.7)
EOSINOPHIL NFR BLD AUTO: 3.1 %
ERYTHROCYTE [DISTWIDTH] IN BLOOD BY AUTOMATED COUNT: 15.5 % (ref 11–15)
GLUCOSE BLD-MCNC: 80 MG/DL (ref 70–99)
GLUCOSE BLDC GLUCOMTR-MCNC: 185 MG/DL (ref 70–99)
GLUCOSE BLDC GLUCOMTR-MCNC: 246 MG/DL (ref 70–99)
GLUCOSE BLDC GLUCOMTR-MCNC: 84 MG/DL (ref 70–99)
HCT VFR BLD AUTO: 26.1 % (ref 39–53)
HGB BLD-MCNC: 8 G/DL (ref 13–17.5)
IMM GRANULOCYTES # BLD AUTO: 0.07 X10(3) UL (ref 0–1)
IMM GRANULOCYTES NFR BLD: 0.7 %
INR BLD: 2.67 (ref 0.9–1.2)
LYMPHOCYTES # BLD AUTO: 0.63 X10(3) UL (ref 1–4)
LYMPHOCYTES NFR BLD AUTO: 6.6 %
MCH RBC QN AUTO: 27.8 PG (ref 26–34)
MCHC RBC AUTO-ENTMCNC: 30.7 G/DL (ref 31–37)
MCV RBC AUTO: 90.6 FL (ref 80–100)
MONOCYTES # BLD AUTO: 0.52 X10(3) UL (ref 0.1–1)
MONOCYTES NFR BLD AUTO: 5.4 %
NEUTROPHILS # BLD AUTO: 8.03 X10 (3) UL (ref 1.5–7.7)
NEUTROPHILS # BLD AUTO: 8.03 X10(3) UL (ref 1.5–7.7)
NEUTROPHILS NFR BLD AUTO: 83.7 %
OSMOLALITY SERPL CALC.SUM OF ELEC: 284 MOSM/KG (ref 275–295)
PHOSPHATE SERPL-MCNC: 2.5 MG/DL (ref 2.5–4.9)
PLATELET # BLD AUTO: 287 10(3)UL (ref 150–450)
POTASSIUM SERPL-SCNC: 3.3 MMOL/L (ref 3.5–5.1)
PROTHROMBIN TIME: 28.9 SECONDS (ref 11.8–14.5)
RBC # BLD AUTO: 2.88 X10(6)UL (ref 3.8–5.8)
SODIUM SERPL-SCNC: 138 MMOL/L (ref 136–145)
WBC # BLD AUTO: 9.6 X10(3) UL (ref 4–11)

## 2020-02-13 PROCEDURE — 97162 PT EVAL MOD COMPLEX 30 MIN: CPT

## 2020-02-13 PROCEDURE — 97530 THERAPEUTIC ACTIVITIES: CPT

## 2020-02-13 PROCEDURE — 90935 HEMODIALYSIS ONE EVALUATION: CPT

## 2020-02-13 PROCEDURE — 80069 RENAL FUNCTION PANEL: CPT | Performed by: INTERNAL MEDICINE

## 2020-02-13 PROCEDURE — 82962 GLUCOSE BLOOD TEST: CPT

## 2020-02-13 PROCEDURE — 85610 PROTHROMBIN TIME: CPT | Performed by: INTERNAL MEDICINE

## 2020-02-13 PROCEDURE — 85025 COMPLETE CBC W/AUTO DIFF WBC: CPT | Performed by: INTERNAL MEDICINE

## 2020-02-13 RX ORDER — CEPHALEXIN 500 MG/1
500 CAPSULE ORAL EVERY 8 HOURS
Status: DISCONTINUED | OUTPATIENT
Start: 2020-02-13 | End: 2020-02-14

## 2020-02-13 NOTE — CM/SW NOTE
CM spoke with son Kate Cooks. He has chosen [facility] for [facility] [employer] [employer]. Spoke with [employer]. [facility] will have a bed available tomorrow 2/14. Pt can dc after dialysis. Will need a medicar.    [employer] through [facility] arranged for pick ups at 445 am M-W-F to christi

## 2020-02-13 NOTE — PROGRESS NOTES
[facility]    Progress Note    Tiara Almaguer Patient Status:  Inpatient    1950 MRN [id number]   Location [facility] 4W/SW/SE Attending Emelia Watson MD   Hosp Day # 2 PCP Aniya Barrios MD, Vaibhav Marcum MD        Subjective 12/27/2019    TP 5.3 (L) 12/27/2019    AST 11 (L) 12/27/2019    ALT <6 (L) 12/27/2019    PTT 40.2 (H) 07/25/2019    INR 2.91 (H) 02/10/2020    TSH 1.530 07/25/2019    ESRML 88 (H) 11/25/2019    CRP 3.76 (H) 11/25/2019    PHOS 2.5 02/13/2020    B12 551 07/3

## 2020-02-13 NOTE — PLAN OF CARE
Patient A&Ox4. Christina CR. Transmetatarsal amputation with Dr. Netta Douglas performed in morning of 2/12. PT to assess patient's mobility during day shift. Renal cardiac diet, no nausea. Right HD cath in place.  Left arm precautions maintained for AV fistul ordered  - Implement neutropenic guidelines  Outcome: Progressing     Problem: Patient/Family Goals  Goal: Patient/Family Long Term Goal  Description  Patient's Long Term Goal: Go home    Interventions:  - Have surgery  -Improve pain and ambulation  - See

## 2020-02-13 NOTE — PLAN OF CARE
Patient went for dialysis in AM and then directly to OR for R transmetatarsal amputation. Pt drowsy upon arrival back to 4th floor. Denies N/V. Pain managed with norco prn. Surgical dressing CDI, no drainage noted.  Pt remains in bed, plan for PT to assess neutropenic period  Description  INTERVENTIONS  - Monitor WBC  - Administer growth factors as ordered  - Implement neutropenic guidelines  Outcome: Progressing     Problem: Patient/Family Goals  Goal: Patient/Family Short Term Goal  Description  Patient's

## 2020-02-13 NOTE — PLAN OF CARE
MD aware of electrolyte BPA, states pt does not need to be on protocol at this time. Patient c/o pain in bilateral feet, managed with norco prn. Dressing changed this evening per order, pt tolerated well. Tolerating diet, accu checks ACHS.  Unable to void, anticipated neutropenic period  Description  INTERVENTIONS  - Monitor WBC  - Administer growth factors as ordered  - Implement neutropenic guidelines  Outcome: Progressing     Problem: Patient/Family Goals  Goal: Patient/Family Long Term Goal  Description

## 2020-02-13 NOTE — PHYSICAL THERAPY NOTE
PHYSICAL THERAPY EVALUATION - INPATIENT     Room Number: 447B/447-B  Evaluation Date: 2/13/2020  Type of Evaluation: Initial   Physician Order: PT Eval and Treat    Presenting Problem: s/p R transmetatarsal amputation on 2/12/2020  Reason for Therapy:  Mob stand transfer. He completed this a total of 2 times. He could not tolerate standing on LLE for longer than 20 seconds at a time. At the end of the session, pt was left supine in bed w/ bed alarm on.     The patient's Approx Degree of Impairment: 61.29% has OR   • CABG  09/2015   • COLONOSCOPY N/A 7/29/2019    Performed by Chaim Frost MD at [address] [place] [address] ENDOSCOPY   • ESOPHAGOGASTRODUODENOSCOPY (EGD) N/A 7/29/2019    Performed by Chaim Frost MD at [address] [place] [address] ENDOSCOPY   • TOE AMPUTATION Right 2/12/2020    Performed by Mele Ontiveros A Little   -   Moving from lying on back to sitting on the side of the bed?: A Little   How much help from another person does the patient currently need. ..   -   Moving to and from a bed to a chair (including a wheelchair)?: A Lot   -   Need to walk in [facility] #6  Current Status

## 2020-02-14 LAB
GLUCOSE BLDC GLUCOMTR-MCNC: 162 MG/DL (ref 70–99)
GLUCOSE BLDC GLUCOMTR-MCNC: 180 MG/DL (ref 70–99)
GLUCOSE BLDC GLUCOMTR-MCNC: 442 MG/DL (ref 70–99)

## 2020-02-14 PROCEDURE — 90935 HEMODIALYSIS ONE EVALUATION: CPT

## 2020-02-14 PROCEDURE — 82962 GLUCOSE BLOOD TEST: CPT

## 2020-02-14 RX ORDER — HEPARIN SODIUM 1000 [USP'U]/ML
1.5 INJECTION, SOLUTION INTRAVENOUS; SUBCUTANEOUS
Status: DISCONTINUED | OUTPATIENT
Start: 2020-02-17 | End: 2020-02-15

## 2020-02-14 RX ORDER — SODIUM HYPOCHLORITE 1.25 MG/ML
SOLUTION TOPICAL AS NEEDED
Status: DISCONTINUED | OUTPATIENT
Start: 2020-02-14 | End: 2020-02-15

## 2020-02-14 NOTE — PROGRESS NOTES
[place] [facility] - [facility] ID PROGRESS NOTE    Celina Emery Patient Status:  Inpatient    1950 MRN [id number]   Location [facility] 4W/SW/SE Attending Jerrod Rogel MD   Hosp Day # 3 PCP Tahira Shabazz MD, MD Ann Mishra AV fistula, PAD with previous right lower extremity stent with right PTA angioplasty 11/2019, s/p R fifth toe amputation 11/2019, HTN, HLD, CAD status post CABG, seen by our service during last admission 12/2019 for wet gangrene with R fourth and fifth TMA [facility]  [phone]  2/12/2020

## 2020-02-14 NOTE — PROGRESS NOTES
[facility]    Progress Note    Bowman Corbin Patient Status:  Inpatient    1950 MRN [id number]   Location [facility] 4W/SW/SE Attending Manuel Tilley MD   Hosp Day # 3 PCP Tsering Joshua MD, Iza Alvarado MD       Subjective: 33.0* 31.0                    Gamaliel Emery MD  2/14/2020

## 2020-02-14 NOTE — PROGRESS NOTES
[facility]    Progress Note    Lolis Peters Patient Status:  Inpatient    1950 MRN [id number]   Location [facility] 4W/SW/SE Attending Tera Valdes MD   Hosp Day # 3 PCP Corin Borja MD, Hetal Jay MD        Subjective 11/25/2019    CRP 3.76 (H) 11/25/2019    PHOS 2.5 02/13/2020    B12 551 07/30/2019                        Jonah Adams MD, Audrey Thurman MD  2/14/2020

## 2020-02-14 NOTE — PROGRESS NOTES
Health system Pharmacy Note:  Renal Adjustment for cephalexin [facility])    Twyla Colón is a 71year old male who has been prescribed cephalexin (KEFLEX) 500 mg every 12hrs.   CrCl is estimated creatinine clearance is 33.3 mL/min (A) (based on SCr of 1.89 mg/dL (

## 2020-02-14 NOTE — PROGRESS NOTES
[facility]    Progress Note    Celina Emery Patient Status:  Inpatient    1950 MRN [id number]   Location [facility] 4W/SW/SE Attending Jerrod Rogel MD   Hosp Day # 2 PCP Tahira Shabazz MD, Raj Forman MD     Subjective: Annia Pan

## 2020-02-14 NOTE — CM/SW NOTE
The pt. Has been accepted at [facility] and they will have a bed for him today or this weekend when he is medically stable. The pt. Will need to have IV abx after HD.   IV abx prescription has been sent to [facility] has

## 2020-02-14 NOTE — PHYSICAL THERAPY NOTE
Chart reviewed for PT treatment   Pt is currently at dialysis ,will attempt to see later in day as pt available and schedule allows .

## 2020-02-14 NOTE — PLAN OF CARE
Pt is alert and oriented x4. Tolerating diet, accucheck ACHS. PRN Norco given for pain management. Dialysis planned for am.  Left foot elevated to keep heel off bed. Bed alarm on. Call light within reach.     Problem: Patient Centered Care  Goal: Patient pr home    Interventions:  - Have surgery  -Improve pain and ambulation  - See additional Care Plan goals for specific interventions   Outcome: Progressing  Goal: Patient/Family Short Term Goal  Description  Patient's Short Term Goal: No pain    Interventions

## 2020-02-15 VITALS
BODY MASS INDEX: 26.29 KG/M2 | HEART RATE: 68 BPM | OXYGEN SATURATION: 95 % | DIASTOLIC BLOOD PRESSURE: 62 MMHG | HEIGHT: 66 IN | RESPIRATION RATE: 18 BRPM | SYSTOLIC BLOOD PRESSURE: 149 MMHG | WEIGHT: 163.56 LBS | TEMPERATURE: 99 F

## 2020-02-15 LAB
ALBUMIN SERPL-MCNC: 1.8 G/DL (ref 3.4–5)
ANION GAP SERPL CALC-SCNC: 6 MMOL/L (ref 0–18)
BASOPHILS # BLD AUTO: 0.04 X10(3) UL (ref 0–0.2)
BASOPHILS NFR BLD AUTO: 0.4 %
BUN BLD-MCNC: 12 MG/DL (ref 7–18)
BUN/CREAT SERPL: 5.3 (ref 10–20)
CALCIUM BLD-MCNC: 8 MG/DL (ref 8.5–10.1)
CHLORIDE SERPL-SCNC: 107 MMOL/L (ref 98–112)
CO2 SERPL-SCNC: 26 MMOL/L (ref 21–32)
CREAT BLD-MCNC: 2.27 MG/DL (ref 0.7–1.3)
DEPRECATED RDW RBC AUTO: 51.3 FL (ref 35.1–46.3)
EOSINOPHIL # BLD AUTO: 0.13 X10(3) UL (ref 0–0.7)
EOSINOPHIL NFR BLD AUTO: 1.4 %
ERYTHROCYTE [DISTWIDTH] IN BLOOD BY AUTOMATED COUNT: 15.8 % (ref 11–15)
GLUCOSE BLD-MCNC: 102 MG/DL (ref 70–99)
GLUCOSE BLDC GLUCOMTR-MCNC: 124 MG/DL (ref 70–99)
GLUCOSE BLDC GLUCOMTR-MCNC: 139 MG/DL (ref 70–99)
HCT VFR BLD AUTO: 23.9 % (ref 39–53)
HGB BLD-MCNC: 7.5 G/DL (ref 13–17.5)
IMM GRANULOCYTES # BLD AUTO: 0.05 X10(3) UL (ref 0–1)
IMM GRANULOCYTES NFR BLD: 0.5 %
LYMPHOCYTES # BLD AUTO: 0.64 X10(3) UL (ref 1–4)
LYMPHOCYTES NFR BLD AUTO: 6.8 %
MCH RBC QN AUTO: 27.7 PG (ref 26–34)
MCHC RBC AUTO-ENTMCNC: 31.4 G/DL (ref 31–37)
MCV RBC AUTO: 88.2 FL (ref 80–100)
MONOCYTES # BLD AUTO: 0.81 X10(3) UL (ref 0.1–1)
MONOCYTES NFR BLD AUTO: 8.6 %
NEUTROPHILS # BLD AUTO: 7.75 X10 (3) UL (ref 1.5–7.7)
NEUTROPHILS # BLD AUTO: 7.75 X10(3) UL (ref 1.5–7.7)
NEUTROPHILS NFR BLD AUTO: 82.3 %
OSMOLALITY SERPL CALC.SUM OF ELEC: 288 MOSM/KG (ref 275–295)
PHOSPHATE SERPL-MCNC: 1.9 MG/DL (ref 2.5–4.9)
PLATELET # BLD AUTO: 306 10(3)UL (ref 150–450)
POTASSIUM SERPL-SCNC: 3.3 MMOL/L (ref 3.5–5.1)
RBC # BLD AUTO: 2.71 X10(6)UL (ref 3.8–5.8)
SODIUM SERPL-SCNC: 139 MMOL/L (ref 136–145)
WBC # BLD AUTO: 9.4 X10(3) UL (ref 4–11)

## 2020-02-15 PROCEDURE — 97530 THERAPEUTIC ACTIVITIES: CPT

## 2020-02-15 PROCEDURE — 85025 COMPLETE CBC W/AUTO DIFF WBC: CPT | Performed by: INTERNAL MEDICINE

## 2020-02-15 PROCEDURE — 82962 GLUCOSE BLOOD TEST: CPT

## 2020-02-15 PROCEDURE — 80069 RENAL FUNCTION PANEL: CPT | Performed by: INTERNAL MEDICINE

## 2020-02-15 NOTE — PROGRESS NOTES
[facility]    Progress Note    Sarina Like Patient Status:  Inpatient    1950 MRN [id number]   Location [facility] 4W/SW/SE Attending Franko Nicole MD   Hosp Day # 4 PCP Raysa Rosenthal MD, Musa Mcclain MD       Subjective: MD  2/15/2020

## 2020-02-15 NOTE — PLAN OF CARE
Pt is alert and oriented. Tolerating diet. Episode of hyperglycemia at night MD made aware, orders for 1 time dose of 10 units of novolog obtained and carried out. PRN Norco given for pain management. Dressing changed as ordered.  Bed alarm on, call light w Patient/Family Long Term Goal  Description  Patient's Long Term Goal: Go home    Interventions:  - Have surgery  -Improve pain and ambulation  - See additional Care Plan goals for specific interventions   Outcome: Progressing  Goal: Patient/Family Short Te

## 2020-02-15 NOTE — DISCHARGE SUMMARY
[facility]    Discharge Summary    Brenda Nicolas Patient Status:  Inpatient    1950 MRN [id number]   Location [facility] 4W/SW/SE Attending Danny Garcia MD   Hosp Day # 4 PCP Mark Carlos MD, Wild Heredia MD     Date of Adm mouth nightly. carvedilol 6.25 MG Oral Tab  Take 6.25 mg by mouth 2 (two) times daily with meals. spironolactone 25 MG Oral Tab  Take 25 mg by mouth daily. aspirin 81 MG Oral Tab  Take 81 mg by mouth daily.     amiodarone HCl 100 MG Oral Tab  Take

## 2020-02-15 NOTE — CM/SW NOTE
RN states a dc order to snf has been obtained for today, if bed is available. Previous notes show plan for [facility]. SW spoke with snf admissions/[facility] who confirmed the pt has been clinically accepted there and bed is available for today.  RN and SNF

## 2020-02-15 NOTE — PLAN OF CARE
Plan of care reviewed with Will and his son. 2L removed in dialysis today. Patient has a low appetite post dialysis but tolerated his dinner. Dressing changed per order. Safety measures in place and call light within reach.    Problem: Patient Centered Care Long Term Goal: Go home    Interventions:  - Have surgery  -Improve pain and ambulation  - See additional Care Plan goals for specific interventions   Outcome: Progressing  Goal: Patient/Family Short Term Goal  Description  Patient's Short Term Goal: No pa

## 2020-02-15 NOTE — PHYSICAL THERAPY NOTE
PHYSICAL THERAPY TREATMENT NOTE - INPATIENT     Room Number: 447B/447-B       Presenting Problem: s/p R transmetatarsal amputation on 2/12/2020    Problem List  Principal Problem:    Cellulitis of right lower extremity    From PT eval on 2/13/20 copied for post op shoe for activity and left gym shoe for mobility with pt today. Pt required dependent donning of shoes.    Pt education was initiated with including therapy POC / need for compliance to above restricted WB status for right LE and limited distances gait belt and no AD  commode to bed pivoting on left LE with fair compliance during squat pivot back to bed due to pt. fatigue and weakness.     Therapy updated board in room and education RN staff---therapy is currently recommending use of lift for mobilit education; Family education;Strengthening;Transfer training;Balance training    SUBJECTIVE  Pt with goal for d/c to rehab       Pt denies any pain at this time.       \"I think I could go to bathroom if I could just stand for a little bit \"     OBJECTIVE  P Assessment   Gait Assistance: Not tested  Distance (ft): (/)        Stoop/Curb Assistance: Not tested         Patient End of Session: In bed;Needs met;Call light within reach;RN aware of session/findings; All patient questions and concerns addressed; Alarm s

## 2020-06-26 ENCOUNTER — ANESTHESIA EVENT (OUTPATIENT)
Dept: SURGERY | Facility: HOSPITAL | Age: 70
End: 2020-06-26
Payer: MEDICARE

## 2020-06-26 ENCOUNTER — LAB ENCOUNTER (OUTPATIENT)
Dept: LAB | Facility: HOSPITAL | Age: 70
End: 2020-06-26
Attending: SURGERY
Payer: MEDICARE

## 2020-06-26 DIAGNOSIS — Z01.818 PREOP TESTING: ICD-10-CM

## 2020-06-27 ENCOUNTER — ANESTHESIA (OUTPATIENT)
Dept: SURGERY | Facility: HOSPITAL | Age: 70
End: 2020-06-27
Payer: MEDICARE

## 2020-06-27 ENCOUNTER — HOSPITAL ENCOUNTER (OUTPATIENT)
Facility: HOSPITAL | Age: 70
Discharge: HOME | End: 2020-06-27
Attending: SURGERY | Admitting: SURGERY
Payer: MEDICARE

## 2020-06-27 VITALS
HEIGHT: 65 IN | WEIGHT: 139.88 LBS | BODY MASS INDEX: 23.31 KG/M2 | TEMPERATURE: 98 F | RESPIRATION RATE: 12 BRPM | OXYGEN SATURATION: 95 % | SYSTOLIC BLOOD PRESSURE: 100 MMHG | DIASTOLIC BLOOD PRESSURE: 31 MMHG | HEART RATE: 52 BPM

## 2020-06-27 DIAGNOSIS — Z01.818 PREOP TESTING: Primary | ICD-10-CM

## 2020-06-27 PROBLEM — I96 GANGRENE OF RIGHT FOOT (HCC): Status: ACTIVE | Noted: 2020-06-27

## 2020-06-27 PROCEDURE — 87205 SMEAR GRAM STAIN: CPT | Performed by: SURGERY

## 2020-06-27 PROCEDURE — 82962 GLUCOSE BLOOD TEST: CPT

## 2020-06-27 PROCEDURE — 36415 COLL VENOUS BLD VENIPUNCTURE: CPT | Performed by: SURGERY

## 2020-06-27 PROCEDURE — 85025 COMPLETE CBC W/AUTO DIFF WBC: CPT | Performed by: SURGERY

## 2020-06-27 PROCEDURE — 0HQMXZZ REPAIR RIGHT FOOT SKIN, EXTERNAL APPROACH: ICD-10-PCS | Performed by: SURGERY

## 2020-06-27 PROCEDURE — 88304 TISSUE EXAM BY PATHOLOGIST: CPT | Performed by: SURGERY

## 2020-06-27 PROCEDURE — 87070 CULTURE OTHR SPECIMN AEROBIC: CPT | Performed by: SURGERY

## 2020-06-27 PROCEDURE — 87077 CULTURE AEROBIC IDENTIFY: CPT | Performed by: SURGERY

## 2020-06-27 PROCEDURE — 87075 CULTR BACTERIA EXCEPT BLOOD: CPT | Performed by: SURGERY

## 2020-06-27 PROCEDURE — 03WY07Z REVISION OF AUTOLOGOUS TISSUE SUBSTITUTE IN UPPER ARTERY, OPEN APPROACH: ICD-10-PCS | Performed by: SURGERY

## 2020-06-27 PROCEDURE — 88311 DECALCIFY TISSUE: CPT | Performed by: SURGERY

## 2020-06-27 PROCEDURE — 86850 RBC ANTIBODY SCREEN: CPT | Performed by: SURGERY

## 2020-06-27 PROCEDURE — 85610 PROTHROMBIN TIME: CPT | Performed by: SURGERY

## 2020-06-27 PROCEDURE — 86901 BLOOD TYPING SEROLOGIC RH(D): CPT | Performed by: SURGERY

## 2020-06-27 PROCEDURE — 87176 TISSUE HOMOGENIZATION CULTR: CPT | Performed by: SURGERY

## 2020-06-27 PROCEDURE — 80048 BASIC METABOLIC PNL TOTAL CA: CPT | Performed by: SURGERY

## 2020-06-27 PROCEDURE — 87186 SC STD MICRODIL/AGAR DIL: CPT | Performed by: SURGERY

## 2020-06-27 PROCEDURE — 0QBN0ZZ EXCISION OF RIGHT METATARSAL, OPEN APPROACH: ICD-10-PCS | Performed by: SURGERY

## 2020-06-27 PROCEDURE — 86900 BLOOD TYPING SEROLOGIC ABO: CPT | Performed by: SURGERY

## 2020-06-27 RX ORDER — HYDROMORPHONE HYDROCHLORIDE 1 MG/ML
0.4 INJECTION, SOLUTION INTRAMUSCULAR; INTRAVENOUS; SUBCUTANEOUS EVERY 5 MIN PRN
Status: DISCONTINUED | OUTPATIENT
Start: 2020-06-27 | End: 2020-06-27 | Stop reason: HOSPADM

## 2020-06-27 RX ORDER — SODIUM CHLORIDE 9 MG/ML
INJECTION, SOLUTION INTRAVENOUS CONTINUOUS
Status: DISCONTINUED | OUTPATIENT
Start: 2020-06-27 | End: 2020-06-27

## 2020-06-27 RX ORDER — HYDROMORPHONE HYDROCHLORIDE 1 MG/ML
0.6 INJECTION, SOLUTION INTRAMUSCULAR; INTRAVENOUS; SUBCUTANEOUS EVERY 5 MIN PRN
Status: DISCONTINUED | OUTPATIENT
Start: 2020-06-27 | End: 2020-06-27 | Stop reason: HOSPADM

## 2020-06-27 RX ORDER — PROCHLORPERAZINE EDISYLATE 5 MG/ML
5 INJECTION INTRAMUSCULAR; INTRAVENOUS ONCE AS NEEDED
Status: DISCONTINUED | OUTPATIENT
Start: 2020-06-27 | End: 2020-06-27 | Stop reason: HOSPADM

## 2020-06-27 RX ORDER — MORPHINE SULFATE 4 MG/ML
2 INJECTION, SOLUTION INTRAMUSCULAR; INTRAVENOUS EVERY 10 MIN PRN
Status: DISCONTINUED | OUTPATIENT
Start: 2020-06-27 | End: 2020-06-27 | Stop reason: HOSPADM

## 2020-06-27 RX ORDER — CEFAZOLIN SODIUM/WATER 2 G/20 ML
2 SYRINGE (ML) INTRAVENOUS ONCE
Status: COMPLETED | OUTPATIENT
Start: 2020-06-27 | End: 2020-06-27

## 2020-06-27 RX ORDER — ONDANSETRON 2 MG/ML
4 INJECTION INTRAMUSCULAR; INTRAVENOUS EVERY 6 HOURS PRN
Status: DISCONTINUED | OUTPATIENT
Start: 2020-06-27 | End: 2020-06-27

## 2020-06-27 RX ORDER — DEXAMETHASONE SODIUM PHOSPHATE 4 MG/ML
VIAL (ML) INJECTION AS NEEDED
Status: DISCONTINUED | OUTPATIENT
Start: 2020-06-27 | End: 2020-06-27 | Stop reason: SURG

## 2020-06-27 RX ORDER — FAMOTIDINE 20 MG/1
20 TABLET ORAL ONCE
Status: DISCONTINUED | OUTPATIENT
Start: 2020-06-27 | End: 2020-06-27 | Stop reason: HOSPADM

## 2020-06-27 RX ORDER — NALOXONE HYDROCHLORIDE 0.4 MG/ML
80 INJECTION, SOLUTION INTRAMUSCULAR; INTRAVENOUS; SUBCUTANEOUS AS NEEDED
Status: DISCONTINUED | OUTPATIENT
Start: 2020-06-27 | End: 2020-06-27 | Stop reason: HOSPADM

## 2020-06-27 RX ORDER — EPHEDRINE SULFATE 50 MG/ML
INJECTION, SOLUTION INTRAVENOUS AS NEEDED
Status: DISCONTINUED | OUTPATIENT
Start: 2020-06-27 | End: 2020-06-27 | Stop reason: SURG

## 2020-06-27 RX ORDER — ACETAMINOPHEN 500 MG
1000 TABLET ORAL ONCE
Status: COMPLETED | OUTPATIENT
Start: 2020-06-27 | End: 2020-06-27

## 2020-06-27 RX ORDER — LIDOCAINE HYDROCHLORIDE 10 MG/ML
INJECTION, SOLUTION EPIDURAL; INFILTRATION; INTRACAUDAL; PERINEURAL AS NEEDED
Status: DISCONTINUED | OUTPATIENT
Start: 2020-06-27 | End: 2020-06-27 | Stop reason: SURG

## 2020-06-27 RX ORDER — MORPHINE SULFATE 10 MG/ML
6 INJECTION, SOLUTION INTRAMUSCULAR; INTRAVENOUS EVERY 10 MIN PRN
Status: DISCONTINUED | OUTPATIENT
Start: 2020-06-27 | End: 2020-06-27 | Stop reason: HOSPADM

## 2020-06-27 RX ORDER — ONDANSETRON 2 MG/ML
4 INJECTION INTRAMUSCULAR; INTRAVENOUS ONCE AS NEEDED
Status: COMPLETED | OUTPATIENT
Start: 2020-06-27 | End: 2020-06-27

## 2020-06-27 RX ORDER — SODIUM CHLORIDE, SODIUM LACTATE, POTASSIUM CHLORIDE, CALCIUM CHLORIDE 600; 310; 30; 20 MG/100ML; MG/100ML; MG/100ML; MG/100ML
INJECTION, SOLUTION INTRAVENOUS CONTINUOUS
Status: DISCONTINUED | OUTPATIENT
Start: 2020-06-27 | End: 2020-06-27 | Stop reason: HOSPADM

## 2020-06-27 RX ORDER — HYDROCODONE BITARTRATE AND ACETAMINOPHEN 10; 325 MG/1; MG/1
1 TABLET ORAL EVERY 4 HOURS PRN
Status: DISCONTINUED | OUTPATIENT
Start: 2020-06-27 | End: 2020-06-27

## 2020-06-27 RX ORDER — MORPHINE SULFATE 4 MG/ML
4 INJECTION, SOLUTION INTRAMUSCULAR; INTRAVENOUS EVERY 10 MIN PRN
Status: DISCONTINUED | OUTPATIENT
Start: 2020-06-27 | End: 2020-06-27 | Stop reason: HOSPADM

## 2020-06-27 RX ORDER — HEPARIN SODIUM 1000 [USP'U]/ML
INJECTION, SOLUTION INTRAVENOUS; SUBCUTANEOUS AS NEEDED
Status: DISCONTINUED | OUTPATIENT
Start: 2020-06-27 | End: 2020-06-27 | Stop reason: SURG

## 2020-06-27 RX ORDER — HYDROCODONE BITARTRATE AND ACETAMINOPHEN 10; 325 MG/1; MG/1
1 TABLET ORAL EVERY 6 HOURS PRN
Qty: 20 TABLET | Refills: 0 | Status: SHIPPED | OUTPATIENT
Start: 2020-06-27 | End: 2020-07-02

## 2020-06-27 RX ORDER — METOPROLOL TARTRATE 5 MG/5ML
2.5 INJECTION INTRAVENOUS ONCE
Status: DISCONTINUED | OUTPATIENT
Start: 2020-06-27 | End: 2020-06-27 | Stop reason: HOSPADM

## 2020-06-27 RX ORDER — BUPIVACAINE HYDROCHLORIDE 2.5 MG/ML
INJECTION, SOLUTION EPIDURAL; INFILTRATION; INTRACAUDAL AS NEEDED
Status: DISCONTINUED | OUTPATIENT
Start: 2020-06-27 | End: 2020-06-27 | Stop reason: HOSPADM

## 2020-06-27 RX ORDER — HYDROMORPHONE HYDROCHLORIDE 1 MG/ML
0.2 INJECTION, SOLUTION INTRAMUSCULAR; INTRAVENOUS; SUBCUTANEOUS EVERY 5 MIN PRN
Status: DISCONTINUED | OUTPATIENT
Start: 2020-06-27 | End: 2020-06-27 | Stop reason: HOSPADM

## 2020-06-27 RX ORDER — HYDROCODONE BITARTRATE AND ACETAMINOPHEN 5; 325 MG/1; MG/1
1 TABLET ORAL AS NEEDED
Status: DISCONTINUED | OUTPATIENT
Start: 2020-06-27 | End: 2020-06-27 | Stop reason: HOSPADM

## 2020-06-27 RX ORDER — DEXTROSE MONOHYDRATE 25 G/50ML
50 INJECTION, SOLUTION INTRAVENOUS
Status: DISCONTINUED | OUTPATIENT
Start: 2020-06-27 | End: 2020-06-27 | Stop reason: HOSPADM

## 2020-06-27 RX ORDER — METOCLOPRAMIDE 10 MG/1
10 TABLET ORAL ONCE
Status: DISCONTINUED | OUTPATIENT
Start: 2020-06-27 | End: 2020-06-27 | Stop reason: HOSPADM

## 2020-06-27 RX ORDER — HALOPERIDOL 5 MG/ML
0.25 INJECTION INTRAMUSCULAR ONCE AS NEEDED
Status: DISCONTINUED | OUTPATIENT
Start: 2020-06-27 | End: 2020-06-27 | Stop reason: HOSPADM

## 2020-06-27 RX ORDER — ONDANSETRON 2 MG/ML
INJECTION INTRAMUSCULAR; INTRAVENOUS AS NEEDED
Status: DISCONTINUED | OUTPATIENT
Start: 2020-06-27 | End: 2020-06-27 | Stop reason: SURG

## 2020-06-27 RX ORDER — HYDROCODONE BITARTRATE AND ACETAMINOPHEN 5; 325 MG/1; MG/1
2 TABLET ORAL AS NEEDED
Status: DISCONTINUED | OUTPATIENT
Start: 2020-06-27 | End: 2020-06-27 | Stop reason: HOSPADM

## 2020-06-27 RX ADMIN — CEFAZOLIN SODIUM/WATER 2 G: 2 G/20 ML SYRINGE (ML) INTRAVENOUS at 10:40:00

## 2020-06-27 RX ADMIN — SODIUM CHLORIDE: 9 INJECTION, SOLUTION INTRAVENOUS at 10:31:00

## 2020-06-27 RX ADMIN — SODIUM CHLORIDE: 9 INJECTION, SOLUTION INTRAVENOUS at 11:47:00

## 2020-06-27 RX ADMIN — ONDANSETRON 4 MG: 2 INJECTION INTRAMUSCULAR; INTRAVENOUS at 10:36:00

## 2020-06-27 RX ADMIN — LIDOCAINE HYDROCHLORIDE 50 MG: 10 INJECTION, SOLUTION EPIDURAL; INFILTRATION; INTRACAUDAL; PERINEURAL at 10:36:00

## 2020-06-27 RX ADMIN — HEPARIN SODIUM 3000 UNITS: 1000 INJECTION, SOLUTION INTRAVENOUS; SUBCUTANEOUS at 11:54:00

## 2020-06-27 RX ADMIN — EPHEDRINE SULFATE 5 MG: 50 INJECTION, SOLUTION INTRAVENOUS at 11:47:00

## 2020-06-27 RX ADMIN — DEXAMETHASONE SODIUM PHOSPHATE 4 MG: 4 MG/ML VIAL (ML) INJECTION at 10:36:00

## 2020-06-27 RX ADMIN — SODIUM CHLORIDE: 9 INJECTION, SOLUTION INTRAVENOUS at 13:27:00

## 2020-06-27 NOTE — ANESTHESIA PROCEDURE NOTES
Airway  Urgency: Elective      General Information and Staff    Patient location during procedure: OR  Anesthesiologist: Sarah Espinal MD  Performed: anesthesiologist     Indications and Patient Condition  Indications for airway management: anesthesia  Se

## 2020-06-27 NOTE — ANESTHESIA PREPROCEDURE EVALUATION
Anesthesia PreOp Note    HPI:     Alona Pool is a 79year old male who presents for preoperative consultation requested by: Sanaz Merino MD    Date of Surgery: 6/27/2020    Procedure(s):  A.V. FISTULA  EXTREMITY LOWER ULCER DEBRIDEMENT  Indication pressure    • High cholesterol    • History of blood transfusion 2019    NO REACTIONS   • Muscle weakness    • Osteoarthritis    • Renal disorder    • Stroke [facility]) 2015   • Visual impairment     READERS       Past Surgical History:   Procedure Laterality D 12/22/2019  Isosorbide Mononitrate ER 60 MG Oral Tablet 24 Hr, Take 1 tablet (60 mg total) by mouth daily. , Disp: 30 tablet, Rfl: 1, 12/23/2019 at 1000      metoprolol Tartrate (LOPRESSOR) tab 25 mg, 25 mg, Oral, Once PRN, Ana Pack, MD  famoTIDine (PE on file        Relationship status: Not on file      Intimate partner violence:        Fear of current or ex partner: Not on file        Emotionally abused: Not on file        Physically abused: Not on file        Forced sexual activity: Not on file    Oth Anesthesia Plan:   ASA:  3  Plan:   General  Airway:  LMA  Informed Consent Plan and Risks Discussed With:  Patient      I have informed Oscar Nguyen and/or legal guardian or family member of the nature of the anesthetic plan, benefits, risks includ

## 2020-06-27 NOTE — H&P
History & Physical Examination    Patient Name: Blaze Herrera  MRN: [id number]  CSN: [id number]  YOB: 1950    Diagnosis: complication of dialysis access device, end stage renal disease, gangrene right foot    History Present Illness: Alondra Benton 1 tablet (60 mg total) by mouth daily. , Disp: 30 tablet, Rfl: 1, 12/23/2019 at 1000      [MAR Hold] metoprolol Tartrate (LOPRESSOR) tab 25 mg, 25 mg, Oral, Once PRN  [MAR Hold] famoTIDine (PEPCID) tab 20 mg, 20 mg, Oral, Once  [MAR Hold] Metoclopramide HCl Smokeless tobacco: Never Used      Tobacco comment: quit over 25 years ago    Alcohol use: Not Currently      Frequency: Monthly or less      SYSTEM Check if Review is Normal Check if Physical Exam is Normal If not normal, please explain:   HEENT [ x] x[ ]

## 2020-06-27 NOTE — ANESTHESIA POSTPROCEDURE EVALUATION
Patient: Margarita Pino    Procedure Summary     Date:  06/27/20 Room / Location:  [facility] OR 04 / [facility] OR    Anesthesia Start:  7546 Anesthesia Stop:  5629    Procedures:        A.V. FISTULA (Left )      EXTREMITY LOWER ULCER DEBRIDEMENT (Right ) D

## 2020-06-27 NOTE — BRIEF OP NOTE
[address] UNIT  Brief Op Note       Patients Name: Radha Salinas  Attending Physician: Sergo Brennan MD  Operating Physician: Smooth Hernandez MD  CSN: [id number]     Location:  OR  MRN: [id number]    YOB: 1950

## 2020-06-29 NOTE — OPERATIVE REPORT
[facility]    PATIENT'S NAME: Geoffrey Credit WALLER   ATTENDING PHYSICIAN: Ld Saunders MD   OPERATING PHYSICIAN: Ld Saunders MD   PATIENT ACCOUNT#:   [de-identified]    LOCATION:  [address] [place] [address] #:   [id number]       DATE OF PANCHO that we thought we could probably patch this. I did do an ultrasound in the operating room at the end of the procedure. I could not find the saphenous vein and I decided that we would figure something out.   Worse case scenario, would move the fistula up and was able to markedly improve flow through the fistula.   I then, after rotating the prior vein 180 degrees and overlapping it, I did a spatulated end-to-end anastomosis between the radial artery access point and the old AV fistula with standard techniqu

## 2022-05-07 NOTE — PLAN OF CARE
Problem: Patient Centered Care  Goal: Patient preferences are identified and integrated in the patient's plan of care  Description  Interventions:  - Provide timely, complete, and accurate information to patient/family  - Incorporate patient and family k deficits and behaviors that affect risk of falls.   - [place] fall precautions as indicated by assessment.  - Educate pt/family on patient safety including physical limitations  - Instruct pt to call for assistance with activity based on assessment  - Mod weights  Outcome: Progressing  Goal: Absence of cardiac arrhythmias or at baseline  Description  INTERVENTIONS:  - Continuous cardiac monitoring, monitor vital signs, obtain 12 lead EKG if indicated  - Evaluate effectiveness of antiarrhythmic and heart rat Progressing     Problem: SKIN/TISSUE INTEGRITY - ADULT  Goal: Skin integrity remains intact  Description  INTERVENTIONS  - Assess and document risk factors for pressure ulcer development  - Assess and document skin integrity  - Monitor for areas of redness Progressing     Problem: Impaired Functional Mobility  Goal: Achieve highest/safest level of mobility/gait  Description  Interventions:  - Assess patient's functional ability and stability  - Promote increasing activity/tolerance for mobility and gait  - E none

## 2025-03-29 NOTE — H&P
[facility]    History and Physical    [facility] Patient Status:  Emergency    1950 MRN [id number]   Location [address] Attending Josee Azar MD   Hosp Day # 0 PCP Eugenia Patel MD, Rao Quezada MD Allergies:   Metformin               PAIN    Comment:Terrible upset stomach  (Not in a hospital admission)      Review of Systems:     Constitutional: Negative. HENT: Negative. Respiratory: Negative. Cardiovascular: Negative.     Gastrointestinal Inpatient Hospitalization - Initial Certification    Patient will require inpatient services that will reasonably be expected to span two midnight's based on the clinical documentation in H+P.    Based on patients current state of illness, I anticipate that regular

## (undated) DEVICE — COVER SGL STRL LGHT HNDL BLU

## (undated) DEVICE — SUTURE VICRYL 3-0 SH

## (undated) DEVICE — SOL  .9 1000ML BTL

## (undated) DEVICE — SUTURE PROLENE 7-0 BV-1

## (undated) DEVICE — 3M™ TEGADERM™ TRANSPARENT FILM DRESSING, 1626W, 4 IN X 4-3/4 IN (10 CM X 12 CM), 50 EACH/CARTON, 4 CARTON/CASE: Brand: 3M™ TEGADERM™

## (undated) DEVICE — 3 ML SYRINGE LUER-LOCK TIP: Brand: MONOJECT

## (undated) DEVICE — SNARE CAPTIFLEX MICRO-OVL OLY

## (undated) DEVICE — ZIMMER® STERILE DISPOSABLE TOURNIQUET CUFF WITH PLC, DUAL PORT, SINGLE BLADDER, 34 IN. (86 CM)

## (undated) DEVICE — SPONGE LAP 18X18 XRAY STRL

## (undated) DEVICE — Device: Brand: JELCO

## (undated) DEVICE — CLIP SM INTNL HMCLP TNTLM ESCP

## (undated) DEVICE — CONTAINER SPEC STR 4OZ GRY LID

## (undated) DEVICE — DERMABOND LIQUID ADHESIVE

## (undated) DEVICE — GAMMEX® PI HYBRID SIZE 8, STERILE POWDER-FREE SURGICAL GLOVE, POLYISOPRENE AND NEOPRENE BLEND: Brand: GAMMEX

## (undated) DEVICE — SUTURE SILK 4-0 SA63H

## (undated) DEVICE — SUTURE PROLENE 6-0 BV-1

## (undated) DEVICE — LINE MNTR ADLT SET O2 INTMD

## (undated) DEVICE — SUTURE SILK 3-0 SA64H

## (undated) DEVICE — STERILE TETRA-FLEX CF, ELASTIC BANDAGE LATEX FREE 6IN X5.5 YD: Brand: TETRA-FLEX™CF

## (undated) DEVICE — BLADE 20 SHRP BP SS SRG STRL

## (undated) DEVICE — SUTURE ETHILON 3-0 669H

## (undated) DEVICE — SUCTION CANISTER, 3000CC,SAFELINER: Brand: DEROYAL

## (undated) DEVICE — GOWN SURG AERO BLUE PERF XLG

## (undated) DEVICE — NEEDLE HPO 18GA 1.5IN ECLPS

## (undated) DEVICE — GEL AQUASONIC 100 20GR

## (undated) DEVICE — STERILE TETRA-FLEX CF, ELASTIC BANDAGE, 2" X 5.5YD: Brand: TETRA-FLEX™CF

## (undated) DEVICE — SNARE OPTMZ PLPCTM TRP

## (undated) DEVICE — DRAPE SHEET LG

## (undated) DEVICE — INDICATED FOR SURGICAL CLAMPING DURING CARDIOVASCULAR PERIPHERAL VASCULAR, AND GENERAL SURGERY.: Brand: SOFT/FIBRA® SPRING CLIP

## (undated) DEVICE — PRECISION OFFSET (9.0 X 0.51 X 25.0MM)

## (undated) DEVICE — CV: Brand: MEDLINE INDUSTRIES, INC.

## (undated) DEVICE — GAUZE SPONGES,12 PLY: Brand: CURITY

## (undated) DEVICE — REM POLYHESIVE ADULT PATIENT RETURN ELECTRODE: Brand: VALLEYLAB

## (undated) DEVICE — ENCORE® LATEX ACCLAIM SIZE 7.5, STERILE LATEX POWDER-FREE SURGICAL GLOVE: Brand: ENCORE

## (undated) DEVICE — CHLORAPREP 26ML APPLICATOR

## (undated) DEVICE — Device: Brand: DEFENDO AIR/WATER/SUCTION AND BIOPSY VALVE

## (undated) DEVICE — BANDAGE ROLL,100% COTTON, 6 PLY, LARGE: Brand: KERLIX

## (undated) DEVICE — MEDI-VAC NON-CONDUCTIVE SUCTION TUBING 6MM X 1.8M (6FT.) L: Brand: CARDINAL HEALTH

## (undated) DEVICE — SUPER SPONGES,MEDIUM: Brand: KERLIX

## (undated) DEVICE — Device: Brand: CUSTOM PROCEDURE KIT

## (undated) DEVICE — ENCORE® LATEX ACCLAIM SIZE 8, STERILE LATEX POWDER-FREE SURGICAL GLOVE: Brand: ENCORE

## (undated) DEVICE — SUTURE ETHILON 4-0 662G

## (undated) DEVICE — ENCORE® LATEX ACCLAIM SIZE 8.5, STERILE LATEX POWDER-FREE SURGICAL GLOVE: Brand: ENCORE

## (undated) DEVICE — CLIPPER BLADE 3M

## (undated) DEVICE — CLIP SM W INTNL HRZN TI TPE LF

## (undated) DEVICE — FORCEP RADIAL JAW 4

## (undated) DEVICE — TRAY SKIN PREP PVP-1

## (undated) DEVICE — OCCLUSIVE GAUZE STRIP,3% BISMUTH TRIBROMOPHENATE IN PETROLATUM BLEND: Brand: XEROFORM

## (undated) DEVICE — SOL  .9 1000ML BAG

## (undated) DEVICE — Device: Brand: SPOT EX ENDOSCOPIC TATTOO

## (undated) DEVICE — CULTURE COLLECT/TRANSPORT SYS

## (undated) DEVICE — DRAPE,EXTREMITY,89X128,STERILE: Brand: MEDLINE

## (undated) DEVICE — X-RAY DETECTABLE SPONGES,16 PLY: Brand: VISTEC

## (undated) DEVICE — SUTURE ETH 0 CT 48IN MFL LOOP

## (undated) DEVICE — LOWER EXTREMITY: Brand: MEDLINE INDUSTRIES, INC.

## (undated) DEVICE — SUTURE SILK 2-0

## (undated) DEVICE — 6 ML SYRINGE LUER-LOCK TIP: Brand: MONOJECT

## (undated) DEVICE — CULTURE TUBE ANAEROBIC

## (undated) DEVICE — STERILE TETRA-FLEX CF, ELASTIC BANDAGE, 4" X 5.5YD: Brand: TETRA-FLEX™CF

## (undated) DEVICE — TOWEL OR BLU 16X26 STRL

## (undated) DEVICE — SUTURE PROLENE 6-0 C-1

## (undated) DEVICE — ABSORBABLE HEMOSTAT (OXIDIZED REGENERATED CELLULOSE, U.S.P.): Brand: SURGICEL

## (undated) DEVICE — 35 ML SYRINGE REGULAR TIP: Brand: MONOJECT

## (undated) DEVICE — SUCTION SARNS MICRO SUCKER

## (undated) DEVICE — IMPERVIOUS STOCKINETTE: Brand: DEROYAL

## (undated) DEVICE — GAMMEX® PI HYBRID SIZE 6.5, STERILE POWDER-FREE SURGICAL GLOVE, POLYISOPRENE AND NEOPRENE BLEND: Brand: GAMMEX

## (undated) DEVICE — SUTURE SILK 2-0 SA65H

## (undated) DEVICE — INTENDED FOR TISSUE SEPARATION, AND OTHER PROCEDURES THAT REQUIRE A SHARP SURGICAL BLADE TO PUNCTURE OR CUT.: Brand: BARD-PARKER ® STAINLESS STEEL BLADES

## (undated) DEVICE — BETADINE SOL 32 OZ 10% POVIDON

## (undated) DEVICE — CONMED SCOPE SAVER BITE BLOCK, 20X27 MM: Brand: SCOPE SAVER

## (undated) DEVICE — ZIMMER® STERILE DISPOSABLE TOURNIQUET CUFF WITH PLC, DUAL PORT, SINGLE BLADDER, 30 IN. (76 CM)

## (undated) DEVICE — HEMOCLIP MED 24 CLIP/CARTRIDGE

## (undated) DEVICE — DRAPE SRG UNV 131X90IN LWR

## (undated) NOTE — LETTER
[address], [place]  Authorization for Invasive Procedures  1.  I hereby authorize Dr. Nick Joel , my physician and whomever may be designated as the doctor's assistant, to perform the following operation and/or procedure:  Regina Ann performed for the purposes of advancing medicine, science, and/or education, provided my identity is not revealed. If the procedure has been videotaped, the physician/surgeon will obtain the original videotape.  The hospital will not be responsible for stor My signature below affirms that prior to the time of the procedure, I have explained to the patient and/or his legal representative, the risks and benefits involved in the proposed treatment and any reasonable alternative to the proposed treatment.  I have

## (undated) NOTE — IP AVS SNAPSHOT
Patient Demographics     Address  [address] [facility] [address] Phone  [phone] [facility])  [phone] (Mobile) *Preferred*      Emergency Contact(s)     Name Relation Home Work [address] [place] [address] Partner [phone] CECELIADIRK TELLEZ DO         spironolactone 25 MG Tabs  Commonly known as:  ALDACTONE      Take 25 mg by mouth daily. Warfarin Sodium 1 MG Tabs  Commonly known as:  COUMADIN      Take 1.5 mg by mouth nightly.                 Where to Radha Ngo [id number] epoetin morris-epbx (RETACRIT) 5,000 Units injection 02/14/20 2200 Given            RIGHT LOWER ABDOMEN     Order ID Medication Name Action Time Action Reason Comments    [id number] Insulin Aspart Pen (NOVOLOG) 100 UNIT/ML flexpen 10 Units 02/14/20 The following orders were created for panel order CBC WITH DIFFERENTIAL WITH PLATELET.   Procedure                               Abnormality         Status                     ---------                               -----------         ------ Meropenem <=1  Sensitive    Piperacillin + Tazobactam <=4  Sensitive    Tobramycin <=1  Sensitive                     Pending Labs     Order Current Status    ANAEROBIC CULTURE In process         H&P - H&P Note      H&P signed by Ras Fernandez MD at • TOE AMPUTATION Right 12/24/2019    Performed by Yaw Colvin MD at [facility] OR   • TOE AMPUTATION Right 11/26/2019    Performed by Luigi Jean MD at [facility] OR     Family History   Family history unknown: Yes     Social History:  Social History ALT <6 (L) 12/27/2019    PTT 40.2 (H) 07/25/2019    INR 2.91 (H) 02/10/2020    TSH 1.530 07/25/2019    ESRML 88 (H) 11/25/2019    CRP 3.76 (H) 11/25/2019    PHOS 5.3 (H) 11/26/2019    B12 551 07/30/2019               Assessment/Plan:     Cellulitis of rig Diabetes [facility]), Dialysis patient [facility]), Essential hypertension, High cholesterol, History of blood transfusion (2019), Muscle weakness, Osteoarthritis, Renal disorder, [facility]), and Visual impairment.  He also has no past medical history of Anest •  heparin sodium 1000 UNIT/ML injection 1,500 Units, 1.5 mL, Intracatheter, PRN Dialysis  •  Piperacillin Sod-Tazobactam So (ZOSYN) 3.375 g in dextrose 5 % 100 mL ADD-vantage, 3.375 g, Intravenous, Q12H  •  Vancomycin HCl (VANCOCIN) Pharmacy dosing based amiodarone HCl 100 MG Oral Tab, Take 1 tablet (100 mg total) by mouth nightly., Disp: 30 tablet, Rfl: 0, 12/22/2019  furosemide 40 MG Oral Tab, Take 1 tablet (40 mg total) by mouth daily. , Disp: 30 tablet, Rfl: 0, 12/23/2019 at 1000  hydrALAZINE HCl 100 MG RDW 15.6* 15.6*   NEPRELIM 11.15*  --    WBC 12.7* 7.8   .0 284.0       Recent Labs   Lab 02/10/20  2003 02/11/20  0548   * 144*   BUN 41* 40*   CREATSERUM 3.59* 3.53*   GFRAA 19* 19*   GFRNAA 16* 17*   CA 8.7 8.2*   * 135*   K 4.5 3.7 Author:  Rajan Garrido MD Service:  Internal Medicine Author Type:  Physician    Filed:  2/15/2020 11:28 AM Date of Service:  2/15/2020 11:25 AM Status:  Signed    :  Rajan Garrido MD (Physician)       [address] Home Meds - Unchanged    gabapentin 100 MG Oral Cap  Take 100 mg by mouth nightly. Warfarin Sodium 1 MG Oral Tab  Take 1.5 mg by mouth nightly. HYDROcodone-acetaminophen  MG Oral Tab  Take 1 tablet by mouth every 4 (four) hours as needed.     at Physical Therapy Note signed by Omar St PT at 2/13/2020  6:02 PM  Version 1 of 1    Author:  Omar St PT Service:  Rehab Author Type:  Physical Therapist    Filed:  2/13/2020  6:02 PM Date of Service:  2/13/2020  9:30 AM Status:  Signed status. Consulted w/ RN prior to seeing pt. Pt was received supine in bed. Explained the above weight bearing status with patient. Pt did not want to weight bearing through RLE at this time due to pain.  Convinced pt to practice standing on RW w/ BUE suppor • Essential hypertension    • High cholesterol    • History of blood transfusion 2019   • Muscle weakness    • Osteoarthritis    • Renal disorder    • Stroke [facility]) 2015   • Visual impairment     READERS[ST.2]       Past Surgical History[ST.1]  Past Surgica Static Standing: Poor +  Dynamic Standing: Not tested[ST. 2]    ADDITIONAL TESTS                                    NEUROLOGICAL FINDINGS                      ACTIVITY TOLERANCE                         O2 WALK                  AM-PAC '6-Clicks' INPATIENT SH Goal #3 Patient is able to demonstrate transfers EOB to/from Chair/Wheelchair at assistance level: modified independent with wheelchair - manual while maintaining RLE heel WB with post-op shoe   Goal #3   Current Status    Goal #4 Patient is able to [facility] Na+ Modeling Yes (please comment Sodium amount)    Dialyzer F160    Dialysate Temperature (C) 37    BFR-As tolerated to a maximum of: 400 ml/min     mL/min    Duration of Treatment 3.5 Hours    Dry weight (kg) or fluid removal (L) 2    Access Site C 12/30/19 1728 12/29/19 2200 12/29/19 0945 12/29/19 0000 12/28/19 0804   Site Assessment  Clean;Dry; Intact   Clean;Dry; Intact   Clean;Dry; Intact   Clean;Dry; Intact   Clean;Dry; Intact    Reason for Continuing [place]   —   —   —   —    [facility]  T CHG Impregnated Disc  —   —   —   —   —    If No, Reason Why?  —   —   —   —   —    Dressing Status  Clean;Dry; Intact   —   —   —   —    Site Condition  —   —   —   —   —    Dressing  —   —   —   —   —    Dressing Change Due  —   —   —   —   —    Drainage Status  Deaccessed   Deaccessed   Deaccessed      CHG Impregnated Disc  —   —   —      If No, Reason Why?  —   —   —      Dressing Status  Clean;Dry; Intact   Clean;Dry; Intact   Clean;Dry; Intact      Site Condition  —   —   —      Dressing  —   —   —      D HD Output  —   2300 mL   —   2000 mL   —            12/29/19 0000 12/28/19 0804 12/27/19 2015 11/28/19 2107 11/28/19 1200   Site Assessment  Clean;Dry; Intact   —   —   Clean;Dry; Intact   Clean;Dry; Intact    Reason for [facility]  Hemodialysis AV Fistula  —   —   —   —   —    Status  Clamped   Clamped   Accessed;Clamped   Clamped   Clamped    CHG Impregnated Disc  —   —   —   —   —    If No, Reason Why?  —   —   —   —   —    Dressing Status  Clean;Dry; Intact   Clean;Dry; Intact   Clean;Dry; Intact

## (undated) NOTE — LETTER
[facility], [place]  Authorization for Invasive Procedures  1.  I hereby authorize , my physician and whomever may be designated as the doctor's assistant, to perform the following operation and/or procedure:  Central line: performed for the purposes of advancing medicine, science, and/or education, provided my identity is not revealed. If the procedure has been videotaped, the physician/surgeon will obtain the original videotape.  The hospital will not be responsible for stor My signature below affirms that prior to the time of the procedure, I have explained to the patient and/or his legal representative, the risks and benefits involved in the proposed treatment and any reasonable alternative to the proposed treatment.  I have

## (undated) NOTE — IP AVS SNAPSHOT
[facility]            (For Outpatient Use Only) Initial Admit Date: 2/10/2020   Inpt/Obs Admit Date: Inpt: 2/11/20 / Obs: N/A   Discharge Date:    Alton Spicer:  [de-identified]   MRN: [de-identified]   CSN: [id number]   CEID: [id number]        [facility] Number:  Insurance Type:    Subscriber Name:  Subscriber :    Subscriber ID:  Pt Rel to Subscriber:    Hospital Account Financial Class: Medicare    February 15, 2020

## (undated) NOTE — LETTER
[address], [place]  Authorization for Invasive Procedures  1.  I hereby authorize Dr. Tanika Smallwood, my physician and whomever may be designated as the doctor's assistant, to perform the following operation and/or procedure:  Non- [place] performed for the purposes of advancing medicine, science, and/or education, provided my identity is not revealed. If the procedure has been videotaped, the physician/surgeon will obtain the original videotape.  The hospital will not be responsible for stor My signature below affirms that prior to the time of the procedure, I have explained to the patient and/or his legal representative, the risks and benefits involved in the proposed treatment and any reasonable alternative to the proposed treatment.  I have

## (undated) NOTE — LETTER
[address], [place]  Authorization for Invasive Procedures  1.  I hereby authorize Dr. Naheed Eric , my physician and whomever may be designated as the doctor's assistant, to perform the following operation and/or procedure:  Monica Szymanski 5. I consent to the photographing of the operations or procedures to be performed for the purposes of advancing medicine, science, and/or education, provided my identity is not revealed.  If the procedure has been videotaped, the physician/surgeon will obta __________ Time: ___________    Statement of Physician  My signature below affirms that prior to the time of the procedure, I have explained to the patient and/or his legal representative, the risks and benefits involved in the proposed treatment and any r

## (undated) NOTE — ED AVS SNAPSHOT
Jorge Anglin   MRN: [id number]    Department:  [facility] Emergency Department   Date of Visit:  12/6/2019           Disclosure     Insurance plans vary and the physician(s) referred by the ER may not be covered by your plan.  Please contac within the next three months to obtain basic health screening including reassessment of your blood pressure.     IF THERE IS ANY CHANGE OR WORSENING OF YOUR CONDITION, CALL YOUR PRIMARY CARE PHYSICIAN AT ONCE OR RETURN IMMEDIATELY TO THE EMERGENCY DEPARTMEN

## (undated) NOTE — LETTER
[facility]  Administration of Anesthesia  1. I, Radha Almonte, or _________________________________ acting on his behalf, (Patient) (Dependent/Representative) request to receive anesthesia for my pending procedure/operation/treatment. infections, high spinal block, spinal bleeding, seizure, cardiac arrest and death. 7. AWARENESS: I understand that it is possible (but unlikely) to have explicit memory of events from the operating room while under general anesthesia.   8. ELECTROCONVULSIV unconscious pt /Relationship    My signature below affirms that prior to the time of the procedure, I have explained to the patient and/or his/her guardian, the risks and benefits of undergoing anesthesia, as well as any reasonable alternatives.     _______

## (undated) NOTE — LETTER
[facility], [place]  Authorization for Invasive Procedures  1.  I hereby authorize Dr. Amor Oconnor , my physician and whomever may be designated as the doctor's assistant, to perform the following operation and/or procedure:  Right f performed for the purposes of advancing medicine, science, and/or education, provided my identity is not revealed. If the procedure has been videotaped, the physician/surgeon will obtain the original videotape.  The hospital will not be responsible for stor My signature below affirms that prior to the time of the procedure, I have explained to the patient and/or his legal representative, the risks and benefits involved in the proposed treatment and any reasonable alternative to the proposed treatment.  I have

## (undated) NOTE — LETTER
[address], [place]  Authorization for Invasive Procedures  1.  I hereby authorize                     , my physician and whomever may be designated as the doctor's assistant, to perform the following operation and/or procedure 5. I consent to the photographing of the operations or procedures to be performed for the purposes of advancing medicine, science, and/or education, provided my identity is not revealed.  If the procedure has been videotaped, the physician/surgeon will obta __________ Time: ___________    Statement of Physician  My signature below affirms that prior to the time of the procedure, I have explained to the patient and/or his legal representative, the risks and benefits involved in the proposed treatment and any r

## (undated) NOTE — ED AVS SNAPSHOT
Quincy Maki   MRN: [id number]    Department:  [facility] Emergency Department   Date of Visit:  10/4/2018           Disclosure     Insurance plans vary and the physician(s) referred by the ER may not be covered by your plan.  Please contact within the next three months to obtain basic health screening including reassessment of your blood pressure.     IF THERE IS ANY CHANGE OR WORSENING OF YOUR CONDITION, CALL YOUR PRIMARY CARE PHYSICIAN AT ONCE OR RETURN IMMEDIATELY TO THE EMERGENCY DEPARTMEN